# Patient Record
Sex: MALE | Race: WHITE | Employment: FULL TIME | URBAN - METROPOLITAN AREA
[De-identification: names, ages, dates, MRNs, and addresses within clinical notes are randomized per-mention and may not be internally consistent; named-entity substitution may affect disease eponyms.]

---

## 2017-02-18 ENCOUNTER — TELEPHONE (OUTPATIENT)
Dept: INTERNAL MEDICINE CLINIC | Facility: CLINIC | Age: 57
End: 2017-02-18

## 2017-02-18 ENCOUNTER — APPOINTMENT (OUTPATIENT)
Dept: CT IMAGING | Facility: HOSPITAL | Age: 57
End: 2017-02-18
Attending: EMERGENCY MEDICINE
Payer: COMMERCIAL

## 2017-02-18 ENCOUNTER — HOSPITAL ENCOUNTER (OUTPATIENT)
Age: 57
Discharge: NURSING, REHAB OR LONG-TERM CARE | End: 2017-02-18
Attending: FAMILY MEDICINE
Payer: COMMERCIAL

## 2017-02-18 ENCOUNTER — HOSPITAL ENCOUNTER (EMERGENCY)
Facility: HOSPITAL | Age: 57
Discharge: HOME | End: 2017-02-18
Attending: EMERGENCY MEDICINE
Payer: COMMERCIAL

## 2017-02-18 VITALS
HEIGHT: 68 IN | OXYGEN SATURATION: 96 % | BODY MASS INDEX: 30.31 KG/M2 | DIASTOLIC BLOOD PRESSURE: 65 MMHG | SYSTOLIC BLOOD PRESSURE: 126 MMHG | WEIGHT: 200 LBS | HEART RATE: 71 BPM | TEMPERATURE: 98 F | RESPIRATION RATE: 16 BRPM

## 2017-02-18 VITALS
TEMPERATURE: 98 F | DIASTOLIC BLOOD PRESSURE: 83 MMHG | HEIGHT: 68 IN | RESPIRATION RATE: 19 BRPM | OXYGEN SATURATION: 98 % | HEART RATE: 68 BPM | WEIGHT: 203 LBS | SYSTOLIC BLOOD PRESSURE: 131 MMHG | BODY MASS INDEX: 30.77 KG/M2

## 2017-02-18 DIAGNOSIS — R42 DIZZINESS: Primary | ICD-10-CM

## 2017-02-18 DIAGNOSIS — R42 LIGHTHEADEDNESS: Primary | ICD-10-CM

## 2017-02-18 DIAGNOSIS — K21.00 GASTROESOPHAGEAL REFLUX DISEASE WITH ESOPHAGITIS: ICD-10-CM

## 2017-02-18 DIAGNOSIS — K21.9 GASTROESOPHAGEAL REFLUX DISEASE WITHOUT ESOPHAGITIS: ICD-10-CM

## 2017-02-18 LAB
ANION GAP SERPL CALC-SCNC: 7 MMOL/L (ref 0–18)
BASOPHILS # BLD: 0 K/UL (ref 0–0.2)
BASOPHILS NFR BLD: 1 %
BUN SERPL-MCNC: 9 MG/DL (ref 8–20)
BUN/CREAT SERPL: 7.4 (ref 10–20)
CALCIUM SERPL-MCNC: 9.1 MG/DL (ref 8.5–10.5)
CHLORIDE SERPL-SCNC: 106 MMOL/L (ref 95–110)
CO2 SERPL-SCNC: 25 MMOL/L (ref 22–32)
CREAT SERPL-MCNC: 1.22 MG/DL (ref 0.5–1.5)
EOSINOPHIL # BLD: 0.1 K/UL (ref 0–0.7)
EOSINOPHIL NFR BLD: 1 %
ERYTHROCYTE [DISTWIDTH] IN BLOOD BY AUTOMATED COUNT: 13.7 % (ref 11–15)
GLUCOSE SERPL-MCNC: 101 MG/DL (ref 70–99)
HCT VFR BLD AUTO: 42.7 % (ref 41–52)
HGB BLD-MCNC: 14.3 G/DL (ref 13.5–17.5)
LYMPHOCYTES # BLD: 2.1 K/UL (ref 1–4)
LYMPHOCYTES NFR BLD: 28 %
MCH RBC QN AUTO: 30 PG (ref 27–32)
MCHC RBC AUTO-ENTMCNC: 33.5 G/DL (ref 32–37)
MCV RBC AUTO: 89.5 FL (ref 80–100)
MONOCYTES # BLD: 0.7 K/UL (ref 0–1)
MONOCYTES NFR BLD: 9 %
NEUTROPHILS # BLD AUTO: 4.6 K/UL (ref 1.8–7.7)
NEUTROPHILS NFR BLD: 61 %
OSMOLALITY UR CALC.SUM OF ELEC: 285 MOSM/KG (ref 275–295)
PLATELET # BLD AUTO: 268 K/UL (ref 140–400)
PMV BLD AUTO: 8.1 FL (ref 7.4–10.3)
POTASSIUM SERPL-SCNC: 3.8 MMOL/L (ref 3.3–5.1)
RBC # BLD AUTO: 4.76 M/UL (ref 4.5–5.9)
SODIUM SERPL-SCNC: 138 MMOL/L (ref 136–144)
TROPONIN I SERPL-MCNC: 0 NG/ML (ref ?–0.03)
WBC # BLD AUTO: 7.6 K/UL (ref 4–11)

## 2017-02-18 PROCEDURE — 85025 COMPLETE CBC W/AUTO DIFF WBC: CPT | Performed by: EMERGENCY MEDICINE

## 2017-02-18 PROCEDURE — 93010 ELECTROCARDIOGRAM REPORT: CPT

## 2017-02-18 PROCEDURE — 93005 ELECTROCARDIOGRAM TRACING: CPT

## 2017-02-18 PROCEDURE — 70450 CT HEAD/BRAIN W/O DYE: CPT

## 2017-02-18 PROCEDURE — 36415 COLL VENOUS BLD VENIPUNCTURE: CPT

## 2017-02-18 PROCEDURE — 99284 EMERGENCY DEPT VISIT MOD MDM: CPT

## 2017-02-18 PROCEDURE — 99205 OFFICE O/P NEW HI 60 MIN: CPT

## 2017-02-18 PROCEDURE — 93010 ELECTROCARDIOGRAM REPORT: CPT | Performed by: FAMILY MEDICINE

## 2017-02-18 PROCEDURE — 84484 ASSAY OF TROPONIN QUANT: CPT | Performed by: EMERGENCY MEDICINE

## 2017-02-18 PROCEDURE — 80048 BASIC METABOLIC PNL TOTAL CA: CPT | Performed by: EMERGENCY MEDICINE

## 2017-02-18 RX ORDER — FAMOTIDINE 20 MG/1
20 TABLET ORAL 2 TIMES DAILY PRN
Qty: 30 TABLET | Refills: 0 | Status: SHIPPED | OUTPATIENT
Start: 2017-02-18 | End: 2017-03-20

## 2017-02-18 RX ORDER — DICYCLOMINE HYDROCHLORIDE 10 MG/1
10 CAPSULE ORAL AS NEEDED
COMMUNITY
End: 2018-10-04

## 2017-02-18 RX ORDER — FAMOTIDINE 20 MG/1
20 TABLET ORAL ONCE
Status: DISCONTINUED | OUTPATIENT
Start: 2017-02-18 | End: 2017-02-18

## 2017-02-18 RX ORDER — FAMOTIDINE 20 MG/1
TABLET ORAL
Status: COMPLETED
Start: 2017-02-18 | End: 2017-02-18

## 2017-02-18 NOTE — TELEPHONE ENCOUNTER
Patient experiencing light headed for the past 10 days   Not sure if he should just go to the emergergecy room   Has appt.  With Dr. Poonam Jimenez on 2/21  Transferred to nurse

## 2017-02-18 NOTE — TELEPHONE ENCOUNTER
Reason for Call/Chief Complaint: States has been feeling lightheaded for 10 days   Onset: States has been coming and going for 10 days   Nursing Assessment/Associated Symptoms: Patient states had sinusitis about a month ago, was treated with Augmentin, pre clinic contact information, hours of operation and will call back if needed. Patient verbalizes understanding and agrees with plan.

## 2017-02-18 NOTE — ED PROVIDER NOTES
Patient Seen in: [address]    History   Patient presents with:  Dizziness (neurologic)    Stated Complaint: dizziness for 10 days     HPI    Patient was stated complaint of \"dizziness\" for approximately 10 days.     Pa type sensation and denies lightheadedness.   He denies weakness, speech difficulty or change in gait    Past Medical History   Diagnosis Date   • Irritable bowel syndrome (IBS)    • Esophageal reflux    • Osteoarthrosis, unspecified whether generalized or l 02/18/17 1441 68   Resp 02/18/17 1441 19   Temp 02/18/17 1441 97.5 °F (36.4 °C)   Temp src 02/18/17 1441 Oral   SpO2 02/18/17 1441 98 %   O2 Device 02/18/17 1441 None (Room air)       Current:/83 mmHg  Pulse 68  Temp(Src) 97.5 °F (36.4 °C) (Oral)  Re to ed    Follow-up:  [facility] Emergency Department  [address]   [address] [address] [phone]  Go to        Medications Prescribed:  Current Discharge Medication List

## 2017-02-18 NOTE — ED INITIAL ASSESSMENT (HPI)
Patient sent from immediate care- c/o of dizziness for x10 days with left side fullness in the head and shoulder. Patient denies any numbness/tingling in extremities and/or vision changes. Denies CP and SOB. Strength equal bilaterally x4 extremities.

## 2017-02-18 NOTE — ED NOTES
PATIENT IS COMPLAINING THAT FOR THE LAST TWO WEEKS HE HAS BEEN HAVING BOUTS OF DIZZINESS. HE STATES THAT AT TIMES HIS VISION IS DIFFERENT ON ONE SIDE OF HIS VISION.   HE STATES HE IS TRYING TO EAT SMALLER MEALS AND IS TAKING A LOT OF TUMS AND DRINKING JIMBO

## 2017-02-18 NOTE — ED INITIAL ASSESSMENT (HPI)
PATIENT IS HERE WITH DIZZINESS ON AND OFF FOR THE LAST 10 DAYS ALONG WITH FREQUENT ACID REFLEX PAIN.

## 2017-02-18 NOTE — ED NOTES
PATIENT TO BE SENT TO [facility] ER FOR FURTHER EVALUATION. VERBAL REPORT GIVEN TO BREE MOODY NURSE IN THE ER.

## 2017-02-19 NOTE — ED PROVIDER NOTES
Patient Seen in: [facility] AND [facility] Emergency Department    History   Patient presents with:  Dizziness (neurologic)    Stated Complaint:     HPI    Patient is a 65-year-old male who presents to the emergency room with a chief complaint of dizziness and l as noted above. PSFH elements reviewed from today and agreed except as otherwise stated in HPI.     Physical Exam       ED Triage Vitals   BP 02/18/17 1610 145/79 mmHg   Pulse 02/18/17 1610 72   Resp 02/18/17 1610 16   Temp 02/18/17 1610 98.3 °F (36.8 °C ---------                               -----------         ------                     CBC W/ DIFFERENTIAL[[id number]]                              Final result                 Please view results for these tests on the individual orders.    RAINBOW

## 2017-02-20 NOTE — TELEPHONE ENCOUNTER
Was seen in the ED and placed on Pepcid which after taking the second dose had trouble breathing. He will not take again. Patient has an appointment to see Mylene Haynes tomorrow 2/21/17 at which time he stated he will discuss.

## 2017-02-21 ENCOUNTER — OFFICE VISIT (OUTPATIENT)
Dept: INTERNAL MEDICINE CLINIC | Facility: CLINIC | Age: 57
End: 2017-02-21

## 2017-02-21 VITALS
DIASTOLIC BLOOD PRESSURE: 76 MMHG | SYSTOLIC BLOOD PRESSURE: 125 MMHG | WEIGHT: 203 LBS | HEART RATE: 78 BPM | BODY MASS INDEX: 30.77 KG/M2 | TEMPERATURE: 99 F | HEIGHT: 68 IN | RESPIRATION RATE: 12 BRPM

## 2017-02-21 DIAGNOSIS — K21.9 GASTROESOPHAGEAL REFLUX DISEASE, ESOPHAGITIS PRESENCE NOT SPECIFIED: ICD-10-CM

## 2017-02-21 DIAGNOSIS — R42 DIZZINESS AND GIDDINESS: Primary | ICD-10-CM

## 2017-02-21 PROCEDURE — 99214 OFFICE O/P EST MOD 30 MIN: CPT | Performed by: INTERNAL MEDICINE

## 2017-02-21 PROCEDURE — 99212 OFFICE O/P EST SF 10 MIN: CPT | Performed by: INTERNAL MEDICINE

## 2017-02-21 NOTE — PROGRESS NOTES
HPI:    Patient ID: Sd Gar is a 49year old male. Dizziness  This is a new (feeling lightheaded ) problem. The current episode started 1 to 4 weeks ago (10 days). The problem occurs intermittently. The problem has been waxing and waning.  Vanda Hines throat. ds   PHYSICAL EXAM:   Physical Exam   Constitutional: He appears well-developed and well-nourished. No distress.    HENT:   Right Ear: External ear normal.   Left Ear: External ear normal.   Nose: Nose normal.   Mouth/Throat: Oropharynx is clear and

## 2017-03-21 PROCEDURE — 88305 TISSUE EXAM BY PATHOLOGIST: CPT | Performed by: INTERNAL MEDICINE

## 2017-04-11 PROBLEM — R42 DIZZINESS AND GIDDINESS: Status: RESOLVED | Noted: 2017-02-21 | Resolved: 2017-04-11

## 2017-04-11 PROBLEM — K21.9 GASTROESOPHAGEAL REFLUX DISEASE: Status: RESOLVED | Noted: 2017-02-21 | Resolved: 2017-04-11

## 2017-04-11 PROBLEM — J38.7 IRRITABLE LARYNX SYNDROME: Status: ACTIVE | Noted: 2017-04-11

## 2017-04-11 PROBLEM — K21.9 LPRD (LARYNGOPHARYNGEAL REFLUX DISEASE): Status: ACTIVE | Noted: 2017-04-11

## 2018-09-18 NOTE — TELEPHONE ENCOUNTER
Patient requesting refill for Bentyl  (Dicyclomine) 20 mg. Patient has been on medication for 30 yrs and needs new RX. Patient is traveling so unable to come in for an appointment.        Please advise this is for irritable bowel syndrome    Patient is almost out of medication

## 2018-09-21 NOTE — TELEPHONE ENCOUNTER
Pt is calling to f/u on refill request.       Current Outpatient Medications:  Dicyclomine HCl 10 MG Oral Cap Take 10 mg by mouth as needed.  Disp:  Rfl:

## 2018-09-25 RX ORDER — DICYCLOMINE HYDROCHLORIDE 10 MG/1
10 CAPSULE ORAL AS NEEDED
Qty: 90 CAPSULE | Refills: 0 | OUTPATIENT
Start: 2018-09-25

## 2018-10-04 ENCOUNTER — OFFICE VISIT (OUTPATIENT)
Dept: INTERNAL MEDICINE CLINIC | Facility: CLINIC | Age: 58
End: 2018-10-04
Payer: COMMERCIAL

## 2018-10-04 VITALS
BODY MASS INDEX: 32 KG/M2 | TEMPERATURE: 99 F | SYSTOLIC BLOOD PRESSURE: 130 MMHG | HEART RATE: 72 BPM | DIASTOLIC BLOOD PRESSURE: 80 MMHG | WEIGHT: 207.31 LBS

## 2018-10-04 DIAGNOSIS — K58.9 IRRITABLE BOWEL SYNDROME, UNSPECIFIED TYPE: ICD-10-CM

## 2018-10-04 DIAGNOSIS — Z12.5 PROSTATE CANCER SCREENING: ICD-10-CM

## 2018-10-04 DIAGNOSIS — Z00.00 ANNUAL PHYSICAL EXAM: Primary | ICD-10-CM

## 2018-10-04 DIAGNOSIS — K21.9 GASTROESOPHAGEAL REFLUX DISEASE, ESOPHAGITIS PRESENCE NOT SPECIFIED: ICD-10-CM

## 2018-10-04 PROCEDURE — 99396 PREV VISIT EST AGE 40-64: CPT | Performed by: INTERNAL MEDICINE

## 2018-10-04 RX ORDER — DICYCLOMINE HYDROCHLORIDE 10 MG/1
10 CAPSULE ORAL
Qty: 120 CAPSULE | Refills: 5 | Status: SHIPPED | OUTPATIENT
Start: 2018-10-04

## 2018-10-04 NOTE — PROGRESS NOTES
HPI:    Patient ID: Jefe Roldan is a 56year old male. Patient presents today for his chekup/physical. He also wants to refill bentyl for his IBS.  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  Disp:  Rfl:      Allergies:  Famotidine              SWELLING    Comment:Patient states med caused his throat to swell and encounter diagnosis)  Plan: COMP METABOLIC PANEL (14), LIPID PANEL, CBC         WITH DIFFERENTIAL WITH PLATELET        Check routine labs. Pt advised to get his colonoscopy.  Pt to get his flu shot from his pharmacy.     (Z12.5) Prostate cancer screening  P

## 2019-08-26 ENCOUNTER — TELEPHONE (OUTPATIENT)
Dept: INTERNAL MEDICINE CLINIC | Facility: CLINIC | Age: 59
End: 2019-08-26

## 2019-08-26 NOTE — TELEPHONE ENCOUNTER
Spoke with patient ( verified) RE: symptoms of tremor below--patient states he has had tremor in right pinky finger x 5 years--had mentioned it to EL at [address] [place] [address]. EL had told patient at that time to monitor and call if worsening.     Patient reports that he h

## 2019-08-26 NOTE — TELEPHONE ENCOUNTER
Spoke with patient ( verified) and relayed EL message below--patient verbalizes understanding and agreement. No further questions/concerns at this time.

## 2019-08-26 NOTE — TELEPHONE ENCOUNTER
Patient has a PPO insurance and would like to know if Dr. Darlin Bradley give him a recommendation for a neurologist for her tremor. Dr and patient spoke about it last year. Patient mentioned it was getting worse.

## 2024-01-11 ENCOUNTER — HOSPITAL ENCOUNTER (EMERGENCY)
Age: 64
Discharge: HOME | End: 2024-01-11
Attending: STUDENT IN AN ORGANIZED HEALTH CARE EDUCATION/TRAINING PROGRAM

## 2024-01-11 ENCOUNTER — APPOINTMENT (OUTPATIENT)
Dept: CT IMAGING | Age: 64
End: 2024-01-11
Attending: STUDENT IN AN ORGANIZED HEALTH CARE EDUCATION/TRAINING PROGRAM

## 2024-01-11 VITALS
DIASTOLIC BLOOD PRESSURE: 87 MMHG | BODY MASS INDEX: 30.14 KG/M2 | SYSTOLIC BLOOD PRESSURE: 171 MMHG | WEIGHT: 198.85 LBS | RESPIRATION RATE: 18 BRPM | HEART RATE: 68 BPM | TEMPERATURE: 97.9 F | OXYGEN SATURATION: 97 % | HEIGHT: 68 IN

## 2024-01-11 DIAGNOSIS — R42 DIZZINESS: Primary | ICD-10-CM

## 2024-01-11 LAB
ALBUMIN SERPL-MCNC: 3.7 G/DL (ref 3.6–5.1)
ALBUMIN/GLOB SERPL: 0.9 {RATIO} (ref 1–2.4)
ALP SERPL-CCNC: 67 UNITS/L (ref 45–117)
ALT SERPL-CCNC: 22 UNITS/L
ANION GAP SERPL CALC-SCNC: 9 MMOL/L (ref 7–19)
AST SERPL-CCNC: 15 UNITS/L
BASOPHILS # BLD: 0 K/MCL (ref 0–0.3)
BASOPHILS NFR BLD: 0 %
BILIRUB SERPL-MCNC: 0.9 MG/DL (ref 0.2–1)
BUN SERPL-MCNC: 19 MG/DL (ref 6–20)
BUN/CREAT SERPL: 15 (ref 7–25)
CALCIUM SERPL-MCNC: 8.9 MG/DL (ref 8.4–10.2)
CHLORIDE SERPL-SCNC: 110 MMOL/L (ref 97–110)
CO2 SERPL-SCNC: 26 MMOL/L (ref 21–32)
CREAT SERPL-MCNC: 1.24 MG/DL (ref 0.67–1.17)
DEPRECATED RDW RBC: 43.6 FL (ref 39–50)
EGFRCR SERPLBLD CKD-EPI 2021: 66 ML/MIN/{1.73_M2}
EOSINOPHIL # BLD: 0.1 K/MCL (ref 0–0.5)
EOSINOPHIL NFR BLD: 1 %
ERYTHROCYTE [DISTWIDTH] IN BLOOD: 13.3 % (ref 11–15)
FASTING DURATION TIME PATIENT: ABNORMAL H
GLOBULIN SER-MCNC: 3.9 G/DL (ref 2–4)
GLUCOSE SERPL-MCNC: 97 MG/DL (ref 70–99)
HCT VFR BLD CALC: 42.1 % (ref 39–51)
HGB BLD-MCNC: 14.1 G/DL (ref 13–17)
IMM GRANULOCYTES # BLD AUTO: 0 K/MCL (ref 0–0.2)
IMM GRANULOCYTES # BLD: 0 %
LYMPHOCYTES # BLD: 2.1 K/MCL (ref 1–4)
LYMPHOCYTES NFR BLD: 28 %
MAGNESIUM SERPL-MCNC: 2.1 MG/DL (ref 1.7–2.4)
MCH RBC QN AUTO: 29.9 PG (ref 26–34)
MCHC RBC AUTO-ENTMCNC: 33.5 G/DL (ref 32–36.5)
MCV RBC AUTO: 89.4 FL (ref 78–100)
MONOCYTES # BLD: 0.5 K/MCL (ref 0.3–0.9)
MONOCYTES NFR BLD: 7 %
NEUTROPHILS # BLD: 4.8 K/MCL (ref 1.8–7.7)
NEUTROPHILS NFR BLD: 64 %
NRBC BLD MANUAL-RTO: 0 /100 WBC
PLATELET # BLD AUTO: 226 K/MCL (ref 140–450)
POTASSIUM SERPL-SCNC: 3.8 MMOL/L (ref 3.4–5.1)
PROT SERPL-MCNC: 7.6 G/DL (ref 6.4–8.2)
RAINBOW EXTRA TUBES HOLD SPECIMEN: NORMAL
RAINBOW EXTRA TUBES HOLD SPECIMEN: NORMAL
RBC # BLD: 4.71 MIL/MCL (ref 4.5–5.9)
SODIUM SERPL-SCNC: 141 MMOL/L (ref 135–145)
TROPONIN I SERPL DL<=0.01 NG/ML-MCNC: 6 NG/L
WBC # BLD: 7.5 K/MCL (ref 4.2–11)

## 2024-01-11 PROCEDURE — 84484 ASSAY OF TROPONIN QUANT: CPT | Performed by: STUDENT IN AN ORGANIZED HEALTH CARE EDUCATION/TRAINING PROGRAM

## 2024-01-11 PROCEDURE — 85025 COMPLETE CBC W/AUTO DIFF WBC: CPT | Performed by: STUDENT IN AN ORGANIZED HEALTH CARE EDUCATION/TRAINING PROGRAM

## 2024-01-11 PROCEDURE — 10002803 HB RX 637: Performed by: STUDENT IN AN ORGANIZED HEALTH CARE EDUCATION/TRAINING PROGRAM

## 2024-01-11 PROCEDURE — 10002807 HB RX 258: Performed by: STUDENT IN AN ORGANIZED HEALTH CARE EDUCATION/TRAINING PROGRAM

## 2024-01-11 PROCEDURE — 93005 ELECTROCARDIOGRAM TRACING: CPT | Performed by: STUDENT IN AN ORGANIZED HEALTH CARE EDUCATION/TRAINING PROGRAM

## 2024-01-11 PROCEDURE — 93010 ELECTROCARDIOGRAM REPORT: CPT | Performed by: INTERNAL MEDICINE

## 2024-01-11 PROCEDURE — 83735 ASSAY OF MAGNESIUM: CPT | Performed by: STUDENT IN AN ORGANIZED HEALTH CARE EDUCATION/TRAINING PROGRAM

## 2024-01-11 PROCEDURE — 70450 CT HEAD/BRAIN W/O DYE: CPT

## 2024-01-11 PROCEDURE — 80053 COMPREHEN METABOLIC PANEL: CPT | Performed by: STUDENT IN AN ORGANIZED HEALTH CARE EDUCATION/TRAINING PROGRAM

## 2024-01-11 RX ORDER — MECLIZINE HYDROCHLORIDE 25 MG/1
25 TABLET ORAL ONCE
Status: COMPLETED | OUTPATIENT
Start: 2024-01-11 | End: 2024-01-11

## 2024-01-11 RX ORDER — MECLIZINE HYDROCHLORIDE 25 MG/1
25 TABLET ORAL 3 TIMES DAILY PRN
Qty: 15 TABLET | Refills: 0 | Status: SHIPPED | OUTPATIENT
Start: 2024-01-11

## 2024-01-11 RX ADMIN — SODIUM CHLORIDE 1000 ML: 9 INJECTION, SOLUTION INTRAVENOUS at 10:35

## 2024-01-11 RX ADMIN — MECLIZINE HYDROCHLORIDE 25 MG: 25 TABLET ORAL at 10:39

## 2024-01-11 ASSESSMENT — PAIN SCALES - GENERAL: PAINLEVEL_OUTOF10: 0

## 2024-01-11 ASSESSMENT — ENCOUNTER SYMPTOMS: DIZZINESS: 1

## 2024-01-12 LAB
ATRIAL RATE (BPM): 59
P AXIS (DEGREES): -15
PR-INTERVAL (MSEC): 178
QRS-INTERVAL (MSEC): 84
QT-INTERVAL (MSEC): 384
QTC: 380
R AXIS (DEGREES): 44
REPORT TEXT: NORMAL
T AXIS (DEGREES): 18
VENTRICULAR RATE EKG/MIN (BPM): 59

## 2024-01-27 ENCOUNTER — TELEPHONE (OUTPATIENT)
Dept: INTERNAL MEDICINE CLINIC | Facility: CLINIC | Age: 64
End: 2024-01-27

## 2024-01-27 NOTE — TELEPHONE ENCOUNTER
Pt states b/p was elevated in the beginning of January and went to ER (see care everywhere).  Pt states he went to a local provider because ER discharged him and he was concerned his b/p was still running 150/90.  Per chart pt was given medication for b/p \"but I don't want to take it until I run it past Dr Esqueda.    Pt also states he saw optometrist and was told looks like he has bulging nerve and pt has appointment now with a \"medical optometrist\" next week.    LOV with Dr Esqueda was 2018 and scheduled online for 2/8/24.  Pt will keep existing OV and also plans on f/u with Dr Alfred.  Advised pt to call insurance to make sure they will cover both visits since they may only pay for pt to have on PCP.  Pt states he wants Dr Esqueda as PCP.      Advised ER if symptoms worsen              1/27/24 10:34 AM  Note  Left message for patient to call back and discuss.                     1/27/24 10:33 AM  Devi Alvarez RN attempted to contact Aguila Rodríguez (Left Message)            1/27/24 10:26 AM  Frida Mcgregor, MAURICIO routed this conversation to Devi Alvarez RN          1/27/24 10:15 AM  Princess De La O routed this conversation to Em Rn Princess Herrera      1/27/24 10:15 AM  Note  Patient scheduled appointment via [facility] for 02/08/2024 with the following:      blood pressure and vision lightheadedness      Also left voicemail, patient needs to be scheduled as a new patient.

## 2024-02-08 ENCOUNTER — LAB ENCOUNTER (OUTPATIENT)
Dept: LAB | Age: 64
End: 2024-02-08
Attending: INTERNAL MEDICINE
Payer: COMMERCIAL

## 2024-02-08 ENCOUNTER — OFFICE VISIT (OUTPATIENT)
Dept: INTERNAL MEDICINE CLINIC | Facility: CLINIC | Age: 64
End: 2024-02-08

## 2024-02-08 ENCOUNTER — EKG ENCOUNTER (OUTPATIENT)
Dept: LAB | Age: 64
End: 2024-02-08
Attending: INTERNAL MEDICINE
Payer: COMMERCIAL

## 2024-02-08 VITALS
DIASTOLIC BLOOD PRESSURE: 80 MMHG | BODY MASS INDEX: 27.96 KG/M2 | HEIGHT: 68 IN | OXYGEN SATURATION: 96 % | WEIGHT: 184.5 LBS | SYSTOLIC BLOOD PRESSURE: 142 MMHG | HEART RATE: 62 BPM | TEMPERATURE: 98 F

## 2024-02-08 DIAGNOSIS — Z00.00 ANNUAL PHYSICAL EXAM: ICD-10-CM

## 2024-02-08 DIAGNOSIS — Z12.11 COLON CANCER SCREENING: ICD-10-CM

## 2024-02-08 DIAGNOSIS — K58.9 IRRITABLE BOWEL SYNDROME, UNSPECIFIED TYPE: ICD-10-CM

## 2024-02-08 DIAGNOSIS — Z00.00 ANNUAL PHYSICAL EXAM: Primary | ICD-10-CM

## 2024-02-08 DIAGNOSIS — H35.033 HYPERTENSIVE RETINOPATHY OF BOTH EYES: ICD-10-CM

## 2024-02-08 DIAGNOSIS — I10 HYPERTENSION, ESSENTIAL: ICD-10-CM

## 2024-02-08 DIAGNOSIS — Z12.5 PROSTATE CANCER SCREENING: ICD-10-CM

## 2024-02-08 DIAGNOSIS — N30.10 INTERSTITIAL CYSTITIS: ICD-10-CM

## 2024-02-08 LAB
ATRIAL RATE: 48 BPM
BILIRUB UR QL: NEGATIVE
CLARITY UR: CLEAR
COLOR UR: YELLOW
GLUCOSE UR-MCNC: NORMAL MG/DL
HGB UR QL STRIP.AUTO: NEGATIVE
KETONES UR-MCNC: NEGATIVE MG/DL
LEUKOCYTE ESTERASE UR QL STRIP.AUTO: NEGATIVE
NITRITE UR QL STRIP.AUTO: NEGATIVE
P AXIS: 66 DEGREES
P-R INTERVAL: 192 MS
PH UR: 5 [PH] (ref 5–8)
Q-T INTERVAL: 410 MS
QRS DURATION: 90 MS
QTC CALCULATION (BEZET): 366 MS
R AXIS: 60 DEGREES
SP GR UR STRIP: 1.03 (ref 1–1.03)
T AXIS: 35 DEGREES
UROBILINOGEN UR STRIP-ACNC: NORMAL
VENTRICULAR RATE: 48 BPM

## 2024-02-08 PROCEDURE — 93010 ELECTROCARDIOGRAM REPORT: CPT | Performed by: STUDENT IN AN ORGANIZED HEALTH CARE EDUCATION/TRAINING PROGRAM

## 2024-02-08 PROCEDURE — 93005 ELECTROCARDIOGRAM TRACING: CPT

## 2024-02-08 PROCEDURE — 3077F SYST BP >= 140 MM HG: CPT | Performed by: INTERNAL MEDICINE

## 2024-02-08 PROCEDURE — 81003 URINALYSIS AUTO W/O SCOPE: CPT

## 2024-02-08 PROCEDURE — 99386 PREV VISIT NEW AGE 40-64: CPT | Performed by: INTERNAL MEDICINE

## 2024-02-08 PROCEDURE — 3079F DIAST BP 80-89 MM HG: CPT | Performed by: INTERNAL MEDICINE

## 2024-02-08 PROCEDURE — 3008F BODY MASS INDEX DOCD: CPT | Performed by: INTERNAL MEDICINE

## 2024-02-08 RX ORDER — MECLIZINE HYDROCHLORIDE 25 MG/1
1 TABLET ORAL 3 TIMES DAILY PRN
COMMUNITY
Start: 2024-01-11

## 2024-02-08 RX ORDER — LOSARTAN POTASSIUM 25 MG/1
TABLET ORAL
COMMUNITY
Start: 2024-01-18

## 2024-02-08 RX ORDER — LOSARTAN POTASSIUM 25 MG/1
25 TABLET ORAL DAILY
Qty: 30 TABLET | Refills: 1 | Status: SHIPPED | OUTPATIENT
Start: 2024-02-08

## 2024-02-08 NOTE — PROGRESS NOTES
Subjective:     Patient ID: Aguila Rodríguez is a 63 year old male.    Patient presents today regarding hypertension. Was diagnosed to have hypertensive retinopathy by optha after he presented to optometrist with complaint of vision problems while playing video games. He states his bp noted also to be elevated and had to go to ER. He had workup done including ct brain which was negative, EKG normal and labs remarkable for mildly elevated creatinine. He then ffup with doctor who had prescribed pt losartan but pt didn't start med due to concerns of possible side effect or affect his IBS.   He had been our patient in the past but has not seen us for more than 5 yrs. He has no history of hypertension before.   Pt also is due for annual physical.     Hypertension  This is a new (noted to have elevated bp when he checked at home.  had vision symptoms prior.) problem. The current episode started 1 to 4 weeks ago. The problem is unchanged. The problem is uncontrolled. Associated symptoms include blurred vision. Pertinent negatives include no chest pain, peripheral edema or shortness of breath. There are no associated agents to hypertension. There are no known risk factors for coronary artery disease. Past treatments include nothing. There are no compliance problems.  There is no history of angina, kidney disease, CAD/MI, CVA, heart failure or PVD. There is no history of chronic renal disease, a hypertension causing med or a thyroid problem.       History/Other:   Review of Systems   Constitutional: Negative.    Eyes:  Positive for blurred vision.   Respiratory: Negative.  Negative for shortness of breath.    Cardiovascular:  Negative for chest pain.   Gastrointestinal:  Positive for anal bleeding. Negative for blood in stool, constipation, nausea and vomiting.   Genitourinary: Negative.    Hematological: Negative.      Current Outpatient Medications   Medication Sig Dispense Refill    VITAMIN D, CHOLECALCIFEROL, OR Take 1  tablet by mouth daily.      Dicyclomine HCl 10 MG Oral Cap Take 1 capsule (10 mg total) by mouth 4 (four) times daily before meals and nightly. 120 capsule 5    DAILY MULTIPLE VITAMINS Oral Tab Take 1 tablet by mouth daily.      losartan 25 MG Oral Tab Take by mouth. (Patient not taking: Reported on 2/8/2024)      meclizine 25 MG Oral Tab Take 1 tablet (25 mg total) by mouth 3 (three) times daily as needed. (Patient not taking: Reported on 2/8/2024)      Spacer/Aero-Holding Chambers (VALVED HOLDING CHAMBER) Does not apply Device U UTD (Patient not taking: Reported on 2/8/2024)  0     Allergies:  Allergies   Allergen Reactions    Famotidine SWELLING     Patient states med caused his throat to swell and cause gagging.     Omeprazole SWELLING     Patient states cause a lump in  His throat. ds       Past Medical History:   Diagnosis Date    Bone spur     C5/C6    Esophageal reflux     Irritable bowel syndrome (IBS)     Lipid screening 06/27/2012    Osteoarthrosis, unspecified whether generalized or localized, unspecified site       Past Surgical History:   Procedure Laterality Date    COLONOSCOPY & POLYPECTOMY  04-    colonoscopy w/ snare polypectomy    KNEE SURGERY        Family History   Problem Relation Age of Onset    Cancer Father         bladder    Ear Problems Mother       Social History:   Social History     Socioeconomic History    Marital status: Single   Tobacco Use    Smoking status: Never    Smokeless tobacco: Never   Vaping Use    Vaping Use: Never used   Substance and Sexual Activity    Alcohol use: Yes     Comment: rarely    Drug use: No   Other Topics Concern    Caffeine Concern No        Objective:   Physical Exam  Constitutional:       General: He is not in acute distress.     Appearance: He is well-developed. He is not ill-appearing, toxic-appearing or diaphoretic.   HENT:      Head: Normocephalic and atraumatic.      Right Ear: Tympanic membrane, ear canal and external ear normal.      Left  Ear: Tympanic membrane, ear canal and external ear normal.      Nose: Nose normal.      Mouth/Throat:      Pharynx: No oropharyngeal exudate.   Eyes:      General: No scleral icterus.        Right eye: No discharge.         Left eye: No discharge.      Conjunctiva/sclera: Conjunctivae normal.      Pupils: Pupils are equal, round, and reactive to light.   Neck:      Thyroid: No thyromegaly.      Vascular: No carotid bruit or JVD.   Cardiovascular:      Rate and Rhythm: Normal rate and regular rhythm.      Pulses: Normal pulses.      Heart sounds: Normal heart sounds. No murmur heard.     No friction rub. No gallop.   Pulmonary:      Effort: Pulmonary effort is normal. No respiratory distress.      Breath sounds: Normal breath sounds. No wheezing or rales.   Abdominal:      General: Abdomen is flat. Bowel sounds are normal. There is no distension or abdominal bruit.      Palpations: Abdomen is soft. There is no mass.      Tenderness: There is no abdominal tenderness. There is no guarding or rebound. Negative signs include Almodovar's sign and McBurney's sign.      Hernia: There is no hernia in the umbilical area.   Musculoskeletal:         General: Normal range of motion.      Cervical back: Normal range of motion and neck supple. No rigidity or tenderness.      Right lower leg: No edema.      Left lower leg: No edema.   Lymphadenopathy:      Cervical: No cervical adenopathy.   Skin:     General: Skin is warm and dry.      Coloration: Skin is not jaundiced or pale.      Findings: No rash.   Neurological:      Mental Status: He is alert and oriented to person, place, and time.   Psychiatric:         Mood and Affect: Mood normal.         Assessment & Plan:   (Z00.00) Annual physical exam  (primary encounter diagnosis)  Plan: Comp Metabolic Panel (14), Hemoglobin A1C,         Lipid Panel, Urinalysis, Routine, PSA Total,         Screen, EKG 12 Lead to be performed at [facility]        Routine labs  ordered. Pt declined flu shot and covid booster.     (I10) Hypertension, essential  Plan: EKG 12 Lead to be performed at [facility] Blood Pressure         Flowsheet        D/w pt; since he already has end organ damage such as hypertensive retinopathy thus importance of control of bp to limit further complications from untreated/uncontrolled hypertension.   Will start pt on losartan 25mg po daily and will see him back in 4 weeks.     (H35.033) Hypertensive retinopathy of both eyes  Plan: control bp thus will start bp med. See above.     (K58.9) Irritable bowel syndrome, unspecified type  Plan: pt has chronic hx of iBS for more than 30 yrs, had been taking dicyclomine prn and remains effective per pt.     (Z12.11) Colon cancer screening  Plan: Gastro Referral - In Network        Pt due for his colonoscopy; pt referred to gastro.     (Z12.5) Prostate cancer screening  Plan: check psa       (N30.10) Interstitial cystitis  Plan: pt had been ff by uro DR Rodriguez before who had retired and pt has not ffup with urologist since then. Will plan to refer to other urologist.        No orders of the defined types were placed in this encounter.      Meds This Visit:  Requested Prescriptions      No prescriptions requested or ordered in this encounter       Imaging & Referrals:  None

## 2024-02-14 ENCOUNTER — TELEPHONE (OUTPATIENT)
Dept: INTERNAL MEDICINE CLINIC | Facility: CLINIC | Age: 64
End: 2024-02-14

## 2024-02-14 NOTE — TELEPHONE ENCOUNTER
Action Requested: Summary for Provider     []  Critical Lab, Recommendations Needed  [x] Need Additional Advice  []   FYI    []   Need Orders  [] Need Medications Sent to Pharmacy  []  Other     SUMMARY: Patient stated just started on Losartan and he is having some side effects.    Currently having lightheadedness, headache, and fatigued    /77,125/75,121/73,134/79 for the last 4 days    Has increase fluid    Asking if he should be worried about potassium foods and asking if ok to take with Meclizine/Tylenol    Advise no interaction found with both    Please advise    Reason for call: Medication Question  Onset: Data Unavailable

## 2024-02-14 NOTE — TELEPHONE ENCOUNTER
His potassium level was normal on labs done in ER before.  As long as he isnt having vomiting and diarrhea then should not have low potassium level.   Losartan also doesn't cause low potassium either.   His bp readings were good and I had just given him the lowest dose of losartan [facility] is 25mg daily; if he wants he can try to take 1/2 tab daily.

## 2024-02-15 NOTE — TELEPHONE ENCOUNTER
Routed to Dr Harris for advise, thanks.      Future Appointments   Date Time Provider Department Center   3/7/2024 11:30 AM Justin Esqueda MD [facility]

## 2024-02-15 NOTE — TELEPHONE ENCOUNTER
Called patient (name and  verified) to relay message.    Patient states that his blood pressure today was 118/70, HR 67    Patient states that watching TV is making his increasingly tired. Patient called this \"Cyber sickness\".     Last OV 24 for annual physical.  Noted patient just started the Losartan on 24.     Instructed to continue to keep blood pressure log and bring with at next appt.    Assisted patient with appt scheduling, verbalized understanding and agrees to plan.   Future Appointments   Date Time Provider Department Center   2024  4:00 PM Justin Esqueda MD [facility]   3/7/2024 11:30 AM Justin Esqueda MD [facility]

## 2024-02-18 ENCOUNTER — TELEPHONE (OUTPATIENT)
Dept: INTERNAL MEDICINE CLINIC | Facility: CLINIC | Age: 64
End: 2024-02-18

## 2024-02-18 NOTE — TELEPHONE ENCOUNTER
I spoke with the patient as the physician on-call.  Recently started on losartan 50 mg a day for elevated blood pressure.  Did not do well initially so he is now cut back down to 25 mg for the past week.  Readings over the past day or so it been in the 150s over 80s.  He notes the right arm is lower blood pressure the left arm.  Heart rate in the 82 range.  Usually it is in the 60s.  He is concerned about all this.  I advised him to increase the losartan from 25 up to a full pill at 50 mg a day.  Check blood pressure and contact us if things are abnormal.  He has an appointment with his primary care doctor this coming week.

## 2024-02-21 ENCOUNTER — TELEPHONE (OUTPATIENT)
Dept: INTERNAL MEDICINE CLINIC | Facility: CLINIC | Age: 64
End: 2024-02-21

## 2024-02-21 NOTE — TELEPHONE ENCOUNTER
Spoke with pt,  verified, pt stated he was seen  by Dr. Rosenberg on 24. Pt c/o extreme reaction on losartan 25 mg every night.   Pt been taking Rx for 10 days, since then he had rxn like sensitive to light and sound, migraine for 2 days, feeling tired, dizziness for 7 weeks, constipation, diarrhea, chills, feeling nervous.  Last noc, he experienced heaviness on bilateral arm and legs, can't even turn on his side or barely moved his arms, muscle are twitching.   Pt sat up at 2-4 in the morning and try to lay down again for 2 hrs.    Pt BP today Rt 140/80 P 73, left arm 157/79 P 74  Pt denies chest pain, shortness of breath, numbness/ tingling, no other sx, not in distress.  Pt was offered an appt today but declined.   Pt was advised if sx persist or gets worse to go to ER/ IC, he agreed and stated understanding.   Pt wants to know if he should continue taking losartan or take 1/2 tab tonight or hold Rx until ov tomorrow or any alternative.   pls advise, thanks in advance.         Future Appointments   Date Time Provider Department Center   2024  4:00 PM Justin Esqueda MD [facility]   3/7/2024 11:30 AM Justin Esqueda MD [facility]

## 2024-02-21 NOTE — TELEPHONE ENCOUNTER
Patient contacted and made aware of Dr. Esqueda's recommendations. Patient instructed any new or worsening symptoms [reviewed] seek immediate medical attention--> 911/ED. Patient verbalized understanding. No further questions or concerns at this time.    Future Appointments   Date Time Provider Department Center   2/22/2024  4:00 PM Justin Esqueda MD [facility]   3/7/2024 11:30 AM Justin Esqueda MD [facility]

## 2024-02-22 ENCOUNTER — OFFICE VISIT (OUTPATIENT)
Dept: INTERNAL MEDICINE CLINIC | Facility: CLINIC | Age: 64
End: 2024-02-22

## 2024-02-22 VITALS
TEMPERATURE: 98 F | HEART RATE: 62 BPM | SYSTOLIC BLOOD PRESSURE: 148 MMHG | WEIGHT: 181.5 LBS | OXYGEN SATURATION: 100 % | BODY MASS INDEX: 27.51 KG/M2 | DIASTOLIC BLOOD PRESSURE: 78 MMHG | HEIGHT: 68 IN

## 2024-02-22 DIAGNOSIS — I10 HYPERTENSION, ESSENTIAL: Primary | ICD-10-CM

## 2024-02-22 PROCEDURE — 3077F SYST BP >= 140 MM HG: CPT | Performed by: INTERNAL MEDICINE

## 2024-02-22 PROCEDURE — 3078F DIAST BP <80 MM HG: CPT | Performed by: INTERNAL MEDICINE

## 2024-02-22 PROCEDURE — 3008F BODY MASS INDEX DOCD: CPT | Performed by: INTERNAL MEDICINE

## 2024-02-22 PROCEDURE — 99213 OFFICE O/P EST LOW 20 MIN: CPT | Performed by: INTERNAL MEDICINE

## 2024-02-22 RX ORDER — AMLODIPINE BESYLATE 2.5 MG/1
2.5 TABLET ORAL DAILY
Qty: 30 TABLET | Refills: 0 | Status: SHIPPED | OUTPATIENT
Start: 2024-02-22

## 2024-02-23 ENCOUNTER — LAB ENCOUNTER (OUTPATIENT)
Dept: LAB | Age: 64
End: 2024-02-23
Attending: INTERNAL MEDICINE
Payer: COMMERCIAL

## 2024-02-23 DIAGNOSIS — Z00.00 ANNUAL PHYSICAL EXAM: ICD-10-CM

## 2024-02-23 LAB
ALBUMIN SERPL-MCNC: 3.8 G/DL (ref 3.4–5)
ALBUMIN/GLOB SERPL: 1.1 {RATIO} (ref 1–2)
ALP LIVER SERPL-CCNC: 64 U/L
ALT SERPL-CCNC: 16 U/L
ANION GAP SERPL CALC-SCNC: 0 MMOL/L (ref 0–18)
AST SERPL-CCNC: 9 U/L (ref 15–37)
BILIRUB SERPL-MCNC: 1.1 MG/DL (ref 0.1–2)
BUN BLD-MCNC: 22 MG/DL (ref 9–23)
CALCIUM BLD-MCNC: 9 MG/DL (ref 8.5–10.1)
CHLORIDE SERPL-SCNC: 112 MMOL/L (ref 98–112)
CHOLEST SERPL-MCNC: 120 MG/DL (ref ?–200)
CO2 SERPL-SCNC: 27 MMOL/L (ref 21–32)
COMPLEXED PSA SERPL-MCNC: 1.47 NG/ML (ref ?–4)
CREAT BLD-MCNC: 1.55 MG/DL
EGFRCR SERPLBLD CKD-EPI 2021: 51 ML/MIN/1.73M2 (ref 60–?)
EST. AVERAGE GLUCOSE BLD GHB EST-MCNC: 114 MG/DL (ref 68–126)
FASTING PATIENT LIPID ANSWER: YES
FASTING STATUS PATIENT QL REPORTED: YES
GLOBULIN PLAS-MCNC: 3.6 G/DL (ref 2.8–4.4)
GLUCOSE BLD-MCNC: 101 MG/DL (ref 70–99)
HBA1C MFR BLD: 5.6 % (ref ?–5.7)
HDLC SERPL-MCNC: 36 MG/DL (ref 40–59)
LDLC SERPL CALC-MCNC: 70 MG/DL (ref ?–100)
NONHDLC SERPL-MCNC: 84 MG/DL (ref ?–130)
OSMOLALITY SERPL CALC.SUM OF ELEC: 291 MOSM/KG (ref 275–295)
POTASSIUM SERPL-SCNC: 4.4 MMOL/L (ref 3.5–5.1)
PROT SERPL-MCNC: 7.4 G/DL (ref 6.4–8.2)
SODIUM SERPL-SCNC: 139 MMOL/L (ref 136–145)
TRIGL SERPL-MCNC: 65 MG/DL (ref 30–149)
VLDLC SERPL CALC-MCNC: 10 MG/DL (ref 0–30)

## 2024-02-23 PROCEDURE — 80053 COMPREHEN METABOLIC PANEL: CPT

## 2024-02-23 PROCEDURE — 80061 LIPID PANEL: CPT

## 2024-02-23 PROCEDURE — 36415 COLL VENOUS BLD VENIPUNCTURE: CPT

## 2024-02-23 PROCEDURE — 83036 HEMOGLOBIN GLYCOSYLATED A1C: CPT

## 2024-02-23 NOTE — PROGRESS NOTES
Subjective:     Patient ID: Aguila Rodríguez is a 63 year old male.    Patient present today for bp ffup; had seen me last week and started on losartan 25mg daily but to didn't tolerate med with having several symptoms (see previous tel encounters for past few days in epic).  I had to stop his bp med 2 days ago due to his complaints of side effects. He had been checking his bp at home and still been elevated though question of accuracy of his bp monitor.         History/Other:   Review of Systems   Constitutional: Negative.    Respiratory: Negative.     Cardiovascular: Negative.      Current Outpatient Medications   Medication Sig Dispense Refill    amLODIPine 2.5 MG Oral Tab Take 1 tablet (2.5 mg total) by mouth daily. 30 tablet 0    DAILY MULTIPLE VITAMINS Oral Tab Take 1 tablet by mouth daily.      losartan 25 MG Oral Tab Take by mouth. (Patient not taking: Reported on 2/8/2024)      meclizine 25 MG Oral Tab Take 1 tablet (25 mg total) by mouth 3 (three) times daily as needed. (Patient not taking: Reported on 2/8/2024)      VITAMIN D, CHOLECALCIFEROL, OR Take 1 tablet by mouth daily. (Patient not taking: Reported on 2/22/2024)      losartan 25 MG Oral Tab Take 1 tablet (25 mg total) by mouth daily. (Patient not taking: Reported on 2/22/2024) 30 tablet 1    Dicyclomine HCl 10 MG Oral Cap Take 1 capsule (10 mg total) by mouth 4 (four) times daily before meals and nightly. (Patient not taking: Reported on 2/22/2024) 120 capsule 5    Spacer/Aero-Holding Chambers (VALVED HOLDING CHAMBER) Does not apply Device U UTD (Patient not taking: Reported on 2/8/2024)  0     Allergies:  Allergies   Allergen Reactions    Famotidine SWELLING     Patient states med caused his throat to swell and cause gagging.     Omeprazole SWELLING     Patient states cause a lump in  His throat. ds    Flonase [Fluticasone] NAUSEA ONLY       Past Medical History:   Diagnosis Date    Bone spur     C5/C6    Esophageal reflux     Interstitial cystitis      Irritable bowel syndrome (IBS)     Lipid screening 06/27/2012    Osteoarthrosis, unspecified whether generalized or localized, unspecified site       Past Surgical History:   Procedure Laterality Date    COLONOSCOPY & POLYPECTOMY  04/22/2008    colonoscopy w/ snare polypectomy    KNEE SURGERY      arthroscopic surgery    TONSILLECTOMY        Family History   Problem Relation Age of Onset    Hypertension Father     Cancer Father         bladder and prostate cancer    Hypertension Mother     Ear Problems Mother     Stroke Paternal Grandfather       Social History:   Social History     Socioeconomic History    Marital status: Single   Tobacco Use    Smoking status: Never    Smokeless tobacco: Never   Vaping Use    Vaping Use: Never used   Substance and Sexual Activity    Alcohol use: Yes     Comment: rarely    Drug use: No   Other Topics Concern    Caffeine Concern No        Objective:   Physical Exam  Constitutional:       General: He is not in acute distress.     Appearance: He is not ill-appearing, toxic-appearing or diaphoretic.   HENT:      Right Ear: External ear normal.      Left Ear: External ear normal.   Eyes:      General: No scleral icterus.        Right eye: No discharge.         Left eye: No discharge.   Cardiovascular:      Rate and Rhythm: Normal rate and regular rhythm.      Pulses: Normal pulses.      Heart sounds: No murmur heard.     No friction rub.   Pulmonary:      Effort: No respiratory distress.      Breath sounds: Normal breath sounds. No wheezing or rales.   Abdominal:      General: Abdomen is flat. Bowel sounds are normal. There is no distension.      Palpations: Abdomen is soft. There is no mass.      Tenderness: There is no abdominal tenderness. There is no guarding.   Musculoskeletal:      Cervical back: Normal range of motion and neck supple. No rigidity or tenderness.      Right lower leg: No edema.      Left lower leg: No edema.   Lymphadenopathy:      Cervical: No cervical  adenopathy.   Skin:     Coloration: Skin is not jaundiced or pale.   Neurological:      Mental Status: He is alert.         Assessment & Plan:   (I10) Hypertension, essential  (primary encounter diagnosis)  Plan: d/w pt with his recent diagnosis of hypertensive retinopathy, I told him will need to get bp controlled better.  Will start amlodipine 2.5mg daily for now stating low dose. Will see him back in 1 week. Call back if any side effects.     No orders of the defined types were placed in this encounter.      Meds This Visit:  Requested Prescriptions     Signed Prescriptions Disp Refills    amLODIPine 2.5 MG Oral Tab 30 tablet 0     Sig: Take 1 tablet (2.5 mg total) by mouth daily.       Imaging & Referrals:  None

## 2024-03-07 ENCOUNTER — OFFICE VISIT (OUTPATIENT)
Dept: INTERNAL MEDICINE CLINIC | Facility: CLINIC | Age: 64
End: 2024-03-07

## 2024-03-07 VITALS
HEIGHT: 68 IN | WEIGHT: 178.19 LBS | SYSTOLIC BLOOD PRESSURE: 130 MMHG | DIASTOLIC BLOOD PRESSURE: 78 MMHG | HEART RATE: 72 BPM | OXYGEN SATURATION: 99 % | TEMPERATURE: 99 F | BODY MASS INDEX: 27.01 KG/M2

## 2024-03-07 DIAGNOSIS — R44.9 ABNORMAL PERCEPTION: ICD-10-CM

## 2024-03-07 DIAGNOSIS — R79.89 ELEVATED SERUM CREATININE: ICD-10-CM

## 2024-03-07 DIAGNOSIS — I10 HYPERTENSION, ESSENTIAL: Primary | ICD-10-CM

## 2024-03-07 PROCEDURE — 3075F SYST BP GE 130 - 139MM HG: CPT | Performed by: INTERNAL MEDICINE

## 2024-03-07 PROCEDURE — 99213 OFFICE O/P EST LOW 20 MIN: CPT | Performed by: INTERNAL MEDICINE

## 2024-03-07 PROCEDURE — 3078F DIAST BP <80 MM HG: CPT | Performed by: INTERNAL MEDICINE

## 2024-03-07 PROCEDURE — 3008F BODY MASS INDEX DOCD: CPT | Performed by: INTERNAL MEDICINE

## 2024-03-07 NOTE — PROGRESS NOTES
Subjective:     Patient ID: Aguila Rodríguez is a 63 year old male.    Hypertension  This is a new problem. The current episode started more than 1 month ago. The problem has been gradually improving since onset. The problem is controlled. Pertinent negatives include no chest pain, palpitations, peripheral edema or shortness of breath. There are no associated agents to hypertension. Risk factors for coronary artery disease include male gender. Past treatments include lifestyle changes. The current treatment provides significant improvement. There are no compliance problems.  Hypertensive end-organ damage includes retinopathy. There is no history of angina, CAD/MI, CVA, heart failure or PVD. There is no history of a hypertension causing med or a thyroid problem.       History/Other:   Review of Systems   Constitutional: Negative.    Eyes:         States having problems with depth perception   Respiratory: Negative.  Negative for shortness of breath.    Cardiovascular:  Negative for chest pain, palpitations and leg swelling.   Gastrointestinal: Negative.    Genitourinary:  Positive for frequency. Negative for dysuria and hematuria.     Current Outpatient Medications   Medication Sig Dispense Refill    amLODIPine 2.5 MG Oral Tab Take 1 tablet (2.5 mg total) by mouth daily. (Patient not taking: Reported on 3/7/2024) 30 tablet 0    losartan 25 MG Oral Tab Take by mouth. (Patient not taking: Reported on 2/8/2024)      meclizine 25 MG Oral Tab Take 1 tablet (25 mg total) by mouth 3 (three) times daily as needed. (Patient not taking: Reported on 2/8/2024)      VITAMIN D, CHOLECALCIFEROL, OR Take 1 tablet by mouth daily. (Patient not taking: Reported on 2/22/2024)      Dicyclomine HCl 10 MG Oral Cap Take 1 capsule (10 mg total) by mouth 4 (four) times daily before meals and nightly. (Patient not taking: Reported on 2/22/2024) 120 capsule 5    Spacer/Aero-Holding Chambers (VALVED HOLDING CHAMBER) Does not apply Device U UTD  (Patient not taking: Reported on 2/8/2024)  0    DAILY MULTIPLE VITAMINS Oral Tab Take 1 tablet by mouth daily. (Patient not taking: Reported on 3/7/2024)       Allergies:  Allergies   Allergen Reactions    Famotidine SWELLING     Patient states med caused his throat to swell and cause gagging.     Omeprazole SWELLING     Patient states cause a lump in  His throat. ds    Flonase [Fluticasone] NAUSEA ONLY       Past Medical History:   Diagnosis Date    Bone spur     C5/C6    Esophageal reflux     Interstitial cystitis     Irritable bowel syndrome (IBS)     Lipid screening 06/27/2012    Osteoarthrosis, unspecified whether generalized or localized, unspecified site       Past Surgical History:   Procedure Laterality Date    COLONOSCOPY & POLYPECTOMY  04/22/2008    colonoscopy w/ snare polypectomy    KNEE SURGERY      arthroscopic surgery    TONSILLECTOMY        Family History   Problem Relation Age of Onset    Hypertension Father     Cancer Father         bladder and prostate cancer    Hypertension Mother     Ear Problems Mother     Stroke Paternal Grandfather       Social History:   Social History     Socioeconomic History    Marital status: Single   Tobacco Use    Smoking status: Never     Passive exposure: Never    Smokeless tobacco: Never   Vaping Use    Vaping Use: Never used   Substance and Sexual Activity    Alcohol use: Yes     Comment: rarely    Drug use: No   Other Topics Concern    Caffeine Concern No        Objective:   Physical Exam  Constitutional:       General: He is not in acute distress.     Appearance: He is well-developed. He is not ill-appearing, toxic-appearing or diaphoretic.   HENT:      Head: Normocephalic and atraumatic.      Right Ear: External ear normal.      Left Ear: External ear normal.      Nose: Nose normal.      Mouth/Throat:      Pharynx: No oropharyngeal exudate.   Eyes:      General:         Right eye: No discharge.         Left eye: No discharge.      Conjunctiva/sclera:  Conjunctivae normal.      Pupils: Pupils are equal, round, and reactive to light.   Neck:      Vascular: No JVD.   Cardiovascular:      Rate and Rhythm: Normal rate and regular rhythm.      Heart sounds: Normal heart sounds. No murmur heard.  Pulmonary:      Effort: Pulmonary effort is normal. No respiratory distress.      Breath sounds: Normal breath sounds. No wheezing or rales.   Abdominal:      General: Bowel sounds are normal. There is no distension.      Palpations: Abdomen is soft. There is no mass.      Tenderness: There is no abdominal tenderness. There is no guarding or rebound.   Musculoskeletal:         General: No tenderness. Normal range of motion.      Cervical back: Normal range of motion and neck supple. No rigidity or tenderness.      Right lower leg: No edema.      Left lower leg: No edema.   Lymphadenopathy:      Cervical: No cervical adenopathy.   Skin:     General: Skin is warm and dry.      Findings: No rash.   Neurological:      Mental Status: He is alert and oriented to person, place, and time.         Assessment & Plan:   (I10) Hypertension, essential  (primary encounter diagnosis)  Plan: bp is now in good range both arms taken several times by myself. He actually didn't take amlodipine and had just been doing diet as well as started walking exercises. I told him we will continue then his life style modication treatment and hold off on bp med for now, I will see him back in 2 weeks to recheck his bp.     (R79.89) Elevated serum creatinine  Plan: Basic Metabolic Panel (8)        Will repeat his bmp and told him if creatinine remains elevated, then will refer hm to nephro.  Avoid any nsaids.     (R44.9) Abnormal perception  Plan: Neuro Referral - In Network        Pt complained of having abnormal depth perception, he had seen his optha for ffp and no eye cause for his and was told likely central. No other neurologic symptoms noted. He had normal ct brain last month. I told him to see  neurologist for further evaluatoin. Referral given . Call back if worsens.        No orders of the defined types were placed in this encounter.      Meds This Visit:  Requested Prescriptions      No prescriptions requested or ordered in this encounter       Imaging & Referrals:  None

## 2024-03-12 ENCOUNTER — TELEPHONE (OUTPATIENT)
Dept: INTERNAL MEDICINE CLINIC | Facility: CLINIC | Age: 64
End: 2024-03-12

## 2024-03-12 NOTE — TELEPHONE ENCOUNTER
Patient contacts clinic reporting he has been taking azo of interstitial cystitis flare.  He inquires if this will affect his follow up BMP to check renal function.  RN advised hydration prior to blood draw.  He will do so and repeat labs on Thursday.  No further nahid.

## 2024-03-14 ENCOUNTER — LAB ENCOUNTER (OUTPATIENT)
Dept: LAB | Age: 64
End: 2024-03-14
Attending: INTERNAL MEDICINE
Payer: COMMERCIAL

## 2024-03-14 DIAGNOSIS — R79.89 ELEVATED SERUM CREATININE: ICD-10-CM

## 2024-03-14 LAB
ANION GAP SERPL CALC-SCNC: 2 MMOL/L (ref 0–18)
BUN BLD-MCNC: 16 MG/DL (ref 9–23)
CALCIUM BLD-MCNC: 9.3 MG/DL (ref 8.5–10.1)
CHLORIDE SERPL-SCNC: 110 MMOL/L (ref 98–112)
CO2 SERPL-SCNC: 27 MMOL/L (ref 21–32)
CREAT BLD-MCNC: 1.75 MG/DL
EGFRCR SERPLBLD CKD-EPI 2021: 44 ML/MIN/1.73M2 (ref 60–?)
FASTING STATUS PATIENT QL REPORTED: YES
GLUCOSE BLD-MCNC: 96 MG/DL (ref 70–99)
OSMOLALITY SERPL CALC.SUM OF ELEC: 289 MOSM/KG (ref 275–295)
POTASSIUM SERPL-SCNC: 4.7 MMOL/L (ref 3.5–5.1)
SODIUM SERPL-SCNC: 139 MMOL/L (ref 136–145)

## 2024-03-14 PROCEDURE — 36415 COLL VENOUS BLD VENIPUNCTURE: CPT

## 2024-03-14 PROCEDURE — 80048 BASIC METABOLIC PNL TOTAL CA: CPT

## 2024-03-15 ENCOUNTER — APPOINTMENT (OUTPATIENT)
Dept: ULTRASOUND IMAGING | Age: 64
End: 2024-03-15
Attending: EMERGENCY MEDICINE

## 2024-03-15 ENCOUNTER — APPOINTMENT (OUTPATIENT)
Dept: CT IMAGING | Age: 64
End: 2024-03-15
Attending: EMERGENCY MEDICINE

## 2024-03-15 ENCOUNTER — HOSPITAL ENCOUNTER (EMERGENCY)
Age: 64
Discharge: HOME | End: 2024-03-15
Attending: EMERGENCY MEDICINE

## 2024-03-15 ENCOUNTER — NURSE TRIAGE (OUTPATIENT)
Dept: INTERNAL MEDICINE CLINIC | Facility: CLINIC | Age: 64
End: 2024-03-15

## 2024-03-15 ENCOUNTER — TELEPHONE (OUTPATIENT)
Dept: INTERNAL MEDICINE CLINIC | Facility: CLINIC | Age: 64
End: 2024-03-15

## 2024-03-15 VITALS
TEMPERATURE: 98 F | SYSTOLIC BLOOD PRESSURE: 134 MMHG | OXYGEN SATURATION: 100 % | HEART RATE: 82 BPM | RESPIRATION RATE: 18 BRPM | DIASTOLIC BLOOD PRESSURE: 76 MMHG

## 2024-03-15 DIAGNOSIS — R51.9 ACUTE NONINTRACTABLE HEADACHE, UNSPECIFIED HEADACHE TYPE: Primary | ICD-10-CM

## 2024-03-15 DIAGNOSIS — R42 LIGHT HEADED: ICD-10-CM

## 2024-03-15 DIAGNOSIS — R79.89 ELEVATED SERUM CREATININE: Primary | ICD-10-CM

## 2024-03-15 LAB
ALBUMIN SERPL-MCNC: 3.8 G/DL (ref 3.6–5.1)
ALBUMIN/GLOB SERPL: 1 {RATIO} (ref 1–2.4)
ALP SERPL-CCNC: 69 UNITS/L (ref 45–117)
ALT SERPL-CCNC: 19 UNITS/L
ANION GAP SERPL CALC-SCNC: 10 MMOL/L (ref 7–19)
AST SERPL-CCNC: 14 UNITS/L
BASOPHILS # BLD: 0 K/MCL (ref 0–0.3)
BASOPHILS NFR BLD: 0 %
BILIRUB SERPL-MCNC: 1 MG/DL (ref 0.2–1)
BUN SERPL-MCNC: 19 MG/DL (ref 6–20)
BUN/CREAT SERPL: 14 (ref 7–25)
CALCIUM SERPL-MCNC: 9 MG/DL (ref 8.4–10.2)
CHLORIDE SERPL-SCNC: 110 MMOL/L (ref 97–110)
CO2 SERPL-SCNC: 25 MMOL/L (ref 21–32)
CREAT SERPL-MCNC: 1.38 MG/DL (ref 0.67–1.17)
DEPRECATED RDW RBC: 43.7 FL (ref 39–50)
EGFRCR SERPLBLD CKD-EPI 2021: 58 ML/MIN/{1.73_M2}
EOSINOPHIL # BLD: 0.1 K/MCL (ref 0–0.5)
EOSINOPHIL NFR BLD: 1 %
ERYTHROCYTE [DISTWIDTH] IN BLOOD: 13.2 % (ref 11–15)
FASTING DURATION TIME PATIENT: ABNORMAL H
GLOBULIN SER-MCNC: 3.9 G/DL (ref 2–4)
GLUCOSE SERPL-MCNC: 133 MG/DL (ref 70–99)
HCT VFR BLD CALC: 41.8 % (ref 39–51)
HGB BLD-MCNC: 14.2 G/DL (ref 13–17)
IMM GRANULOCYTES # BLD AUTO: 0 K/MCL (ref 0–0.2)
IMM GRANULOCYTES # BLD: 0 %
LYMPHOCYTES # BLD: 1.7 K/MCL (ref 1–4)
LYMPHOCYTES NFR BLD: 22 %
MAGNESIUM SERPL-MCNC: 2.3 MG/DL (ref 1.7–2.4)
MCH RBC QN AUTO: 30.8 PG (ref 26–34)
MCHC RBC AUTO-ENTMCNC: 34 G/DL (ref 32–36.5)
MCV RBC AUTO: 90.7 FL (ref 78–100)
MONOCYTES # BLD: 0.7 K/MCL (ref 0.3–0.9)
MONOCYTES NFR BLD: 8 %
NEUTROPHILS # BLD: 5.3 K/MCL (ref 1.8–7.7)
NEUTROPHILS NFR BLD: 69 %
NRBC BLD MANUAL-RTO: 0 /100 WBC
PLATELET # BLD AUTO: 244 K/MCL (ref 140–450)
POTASSIUM SERPL-SCNC: 4.1 MMOL/L (ref 3.4–5.1)
PROT SERPL-MCNC: 7.7 G/DL (ref 6.4–8.2)
RBC # BLD: 4.61 MIL/MCL (ref 4.5–5.9)
SODIUM SERPL-SCNC: 141 MMOL/L (ref 135–145)
WBC # BLD: 7.8 K/MCL (ref 4.2–11)

## 2024-03-15 PROCEDURE — 10002807 HB RX 258: Performed by: EMERGENCY MEDICINE

## 2024-03-15 PROCEDURE — 85025 COMPLETE CBC W/AUTO DIFF WBC: CPT | Performed by: EMERGENCY MEDICINE

## 2024-03-15 PROCEDURE — 83735 ASSAY OF MAGNESIUM: CPT | Performed by: EMERGENCY MEDICINE

## 2024-03-15 PROCEDURE — 96361 HYDRATE IV INFUSION ADD-ON: CPT

## 2024-03-15 PROCEDURE — 96360 HYDRATION IV INFUSION INIT: CPT

## 2024-03-15 PROCEDURE — 80053 COMPREHEN METABOLIC PANEL: CPT | Performed by: EMERGENCY MEDICINE

## 2024-03-15 PROCEDURE — 70450 CT HEAD/BRAIN W/O DYE: CPT

## 2024-03-15 PROCEDURE — 76775 US EXAM ABDO BACK WALL LIM: CPT

## 2024-03-15 PROCEDURE — 99284 EMERGENCY DEPT VISIT MOD MDM: CPT

## 2024-03-15 RX ORDER — DIPHENHYDRAMINE HYDROCHLORIDE 50 MG/ML
25 INJECTION INTRAMUSCULAR; INTRAVENOUS ONCE
Status: DISCONTINUED | OUTPATIENT
Start: 2024-03-15 | End: 2024-03-15

## 2024-03-15 RX ORDER — PROCHLORPERAZINE EDISYLATE 5 MG/ML
5 INJECTION INTRAMUSCULAR; INTRAVENOUS ONCE
Status: DISCONTINUED | OUTPATIENT
Start: 2024-03-15 | End: 2024-03-15

## 2024-03-15 RX ADMIN — SODIUM CHLORIDE 1000 ML: 9 INJECTION, SOLUTION INTRAVENOUS at 16:35

## 2024-03-15 SDOH — SOCIAL STABILITY: SOCIAL INSECURITY: HOW OFTEN DOES ANYONE, INCLUDING FAMILY AND FRIENDS, PHYSICALLY HURT YOU?: NEVER

## 2024-03-15 SDOH — SOCIAL STABILITY: SOCIAL INSECURITY: HOW OFTEN DOES ANYONE, INCLUDING FAMILY AND FRIENDS, INSULT OR TALK DOWN TO YOU?: NEVER

## 2024-03-15 SDOH — SOCIAL STABILITY: SOCIAL INSECURITY: HOW OFTEN DOES ANYONE, INCLUDING FAMILY AND FRIENDS, THREATEN YOU WITH HARM?: NEVER

## 2024-03-15 SDOH — SOCIAL STABILITY: SOCIAL INSECURITY: HOW OFTEN DOES ANYONE, INCLUDING FAMILY AND FRIENDS, SCREAM OR CURSE AT YOU?: NEVER

## 2024-03-15 NOTE — TELEPHONE ENCOUNTER
Action Requested: Summary for Provider     []  Critical Lab, Recommendations Needed  [x] Need Additional Advice  []   FYI    []   Need Orders  [] Need Medications Sent to Pharmacy  []  Other     SUMMARY: Spoke with patient about results and plan. He states understanding but wants MD to know he has been feeling dizzy the last few days. B/P at home 147/80, 148/78 pulse 71-72 range. He is able to get up and walk but must have his cane and go slowly. Says that after watching TV symptoms are worse. Has retinopathy and says symptoms are same as when first DX with this.     Per recommendations of protocol, patient to be seen today - Please advise if you would like to see or other plan for care today. IC/ER?     Patient taking tylenol a couple times a day, has been urinating every two hours or more freqent. Urine color clear or yellow. Did take Azo a few days ago, minimal help.     Patient has cystitis and when he eats fruit this flares, he did have kiwi and other fruit this week which he thinks is causing some of this.       Reason for call: Dizziness  Onset: few day now       Reason for Disposition   Lightheadedness (dizziness) present now, after 2 hours of rest and fluids    Protocols used: Dizziness-A-OH

## 2024-03-15 NOTE — TELEPHONE ENCOUNTER
Ptnakita called from ER in [place].  He says they want to perform blood work but he just had blood work done yesterday.  RN encouraged patient to comply with the acute work up in the ER.  Based on his symptoms there could be acute changes that need to be evaluated.  He verbalized understanding.

## 2024-03-15 NOTE — TELEPHONE ENCOUNTER
Would recommend to go to ER given his current symptoms that he now even needs to use cane to ambulate. May need to repeat his scan or mri his brain given above symptoms.

## 2024-03-15 NOTE — TELEPHONE ENCOUNTER
ER f/u 3/16    HOLLY Esqueda pt was called and inform of your message below and he verbalized understanding. Pt stated that he will talk to his fiance and see if she can take him to ER. Pt stated that he will not be going to [facility]. Pt stated that he might go to [facility] ER. Pt was inform he should not drive if he feels dizzy pt verbalized understanding.

## 2024-03-16 LAB
RAINBOW EXTRA TUBES HOLD SPECIMEN: NORMAL

## 2024-03-18 NOTE — TELEPHONE ENCOUNTER
Patient following up with PCP on 3/21.    Future Appointments   Date Time Provider Department Center   3/21/2024 11:30 AM Justin Esqueda MD [facility]   4/15/2024  9:00 AM [facility]

## 2024-03-21 ENCOUNTER — OFFICE VISIT (OUTPATIENT)
Dept: INTERNAL MEDICINE CLINIC | Facility: CLINIC | Age: 64
End: 2024-03-21
Payer: COMMERCIAL

## 2024-03-21 ENCOUNTER — HOSPITAL ENCOUNTER (OUTPATIENT)
Dept: GENERAL RADIOLOGY | Age: 64
Discharge: HOME | End: 2024-03-21
Attending: INTERNAL MEDICINE
Payer: COMMERCIAL

## 2024-03-21 VITALS
OXYGEN SATURATION: 97 % | BODY MASS INDEX: 26.6 KG/M2 | HEIGHT: 68 IN | TEMPERATURE: 98 F | DIASTOLIC BLOOD PRESSURE: 76 MMHG | WEIGHT: 175.5 LBS | SYSTOLIC BLOOD PRESSURE: 128 MMHG | HEART RATE: 67 BPM

## 2024-03-21 DIAGNOSIS — R42 DIZZINESS: ICD-10-CM

## 2024-03-21 DIAGNOSIS — G89.29 CHRONIC RIGHT-SIDED THORACIC BACK PAIN: ICD-10-CM

## 2024-03-21 DIAGNOSIS — R79.89 ELEVATED SERUM CREATININE: ICD-10-CM

## 2024-03-21 DIAGNOSIS — I10 HYPERTENSION, ESSENTIAL: Primary | ICD-10-CM

## 2024-03-21 DIAGNOSIS — M54.6 CHRONIC RIGHT-SIDED THORACIC BACK PAIN: ICD-10-CM

## 2024-03-21 DIAGNOSIS — N30.10 INTERSTITIAL CYSTITIS: ICD-10-CM

## 2024-03-21 DIAGNOSIS — F41.1 GAD (GENERALIZED ANXIETY DISORDER): ICD-10-CM

## 2024-03-21 PROCEDURE — 71101 X-RAY EXAM UNILAT RIBS/CHEST: CPT | Performed by: INTERNAL MEDICINE

## 2024-03-21 PROCEDURE — 99213 OFFICE O/P EST LOW 20 MIN: CPT | Performed by: INTERNAL MEDICINE

## 2024-03-21 NOTE — PROGRESS NOTES
Subjective:     Patient ID: Aguila Rodríguez is a 63 year old male.    Patient here today for ffup; was sent to ER last week due to worsening dizziness.  He went to [facility] ER,  ct brain done negative;  creatinine was improving down to 1.3 from 1.7 few days before.   His bp has been overall stable without bp meds. Had tried losartan 25mg for few days a month ago but had to stop due to not tolerating med. He didn't start amlodipine 2.5mg given as replacement for losartan due to his concern he will not tolerate.         History/Other:   Review of Systems   Constitutional: Negative.    Respiratory: Negative.     Cardiovascular: Negative.    Gastrointestinal: Negative.    Genitourinary:  Positive for dysuria and frequency. Negative for hematuria.   Musculoskeletal:  Positive for back pain.   Neurological:  Positive for dizziness.     Current Outpatient Medications   Medication Sig Dispense Refill    amLODIPine 2.5 MG Oral Tab Take 1 tablet (2.5 mg total) by mouth daily. (Patient not taking: Reported on 3/7/2024) 30 tablet 0    meclizine 25 MG Oral Tab Take 1 tablet (25 mg total) by mouth 3 (three) times daily as needed. (Patient not taking: Reported on 2/8/2024)      VITAMIN D, CHOLECALCIFEROL, OR Take 1 tablet by mouth daily. (Patient not taking: Reported on 2/22/2024)      Dicyclomine HCl 10 MG Oral Cap Take 1 capsule (10 mg total) by mouth 4 (four) times daily before meals and nightly. (Patient not taking: Reported on 2/22/2024) 120 capsule 5    Spacer/Aero-Holding Chambers (VALVED HOLDING CHAMBER) Does not apply Device U UTD (Patient not taking: Reported on 2/8/2024)  0    DAILY MULTIPLE VITAMINS Oral Tab Take 1 tablet by mouth daily. (Patient not taking: Reported on 3/7/2024)       Allergies:  Allergies   Allergen Reactions    Famotidine SWELLING     Patient states med caused his throat to swell and cause gagging.     Omeprazole SWELLING     Patient states cause a lump in  His throat. ds    Flonase [Fluticasone]  NAUSEA ONLY       Past Medical History:   Diagnosis Date    Bone spur     C5/C6    Esophageal reflux     Interstitial cystitis     Irritable bowel syndrome (IBS)     Lipid screening 06/27/2012    Osteoarthrosis, unspecified whether generalized or localized, unspecified site       Past Surgical History:   Procedure Laterality Date    COLONOSCOPY & POLYPECTOMY  04/22/2008    colonoscopy w/ snare polypectomy    KNEE SURGERY      arthroscopic surgery    TONSILLECTOMY        Family History   Problem Relation Age of Onset    Hypertension Father     Cancer Father         bladder and prostate cancer    Hypertension Mother     Ear Problems Mother     Stroke Paternal Grandfather       Social History:   Social History     Socioeconomic History    Marital status: Single   Tobacco Use    Smoking status: Never     Passive exposure: Never    Smokeless tobacco: Never   Vaping Use    Vaping Use: Never used   Substance and Sexual Activity    Alcohol use: Yes     Comment: rarely    Drug use: No   Other Topics Concern    Caffeine Concern No        Objective:   Physical Exam  Constitutional:       General: He is not in acute distress.     Appearance: He is well-developed. He is not ill-appearing, toxic-appearing or diaphoretic.   HENT:      Head: Normocephalic and atraumatic.      Right Ear: Tympanic membrane, ear canal and external ear normal.      Left Ear: Tympanic membrane, ear canal and external ear normal.      Nose: Nose normal.      Mouth/Throat:      Pharynx: No oropharyngeal exudate.   Eyes:      General:         Right eye: No discharge.         Left eye: No discharge.      Conjunctiva/sclera: Conjunctivae normal.      Pupils: Pupils are equal, round, and reactive to light.   Neck:      Vascular: No JVD.   Cardiovascular:      Rate and Rhythm: Normal rate and regular rhythm.      Heart sounds: Normal heart sounds. No murmur heard.  Pulmonary:      Effort: Pulmonary effort is normal. No respiratory distress.      Breath sounds:  Normal breath sounds. No wheezing or rales.   Abdominal:      General: Bowel sounds are normal. There is no distension.      Palpations: Abdomen is soft. There is no mass.      Tenderness: There is no abdominal tenderness. There is no guarding or rebound.   Musculoskeletal:         General: No tenderness. Normal range of motion.      Cervical back: Normal range of motion and neck supple. No rigidity or tenderness.      Right lower leg: No edema.      Left lower leg: No edema.   Lymphadenopathy:      Cervical: No cervical adenopathy.   Skin:     General: Skin is warm and dry.      Findings: No rash.   Neurological:      Mental Status: He is alert and oriented to person, place, and time.   Psychiatric:         Attention and Perception: Attention normal.         Mood and Affect: Mood is anxious.         Speech: Speech normal.         Behavior: Behavior normal. Behavior is cooperative.         Thought Content: Thought content is not paranoid or delusional. Thought content does not include homicidal or suicidal ideation.         Assessment & Plan:   (I10) Hypertension, essential  (primary encounter diagnosis)  Plan: bp still good without bp med since he has been hesitant before due to side effects; had been watching his diet for salt and had started some exercises.     (R79.89) Elevated serum creatinine  Plan: going down a 1.3 now in ER; continue to avoid nsaids and maintain good hydration. He will keep apptment with nephro.     (R42) Dizziness  Plan: persistent dizziness and 2 negative ct brain before, pt has apptment to see neuro.     (N30.10) Interstitial cystitis  Plan: Urology Referral - [place] ([facility])        Pt continues to have urinary symptoms, ua done before negative; he had been diagnosed years ago with interstitial cystitis but last time seen by uro was 10 yrs ago. I told him he needs to see uroloigst and gave him referral.     (M54.6,  G89.29) Chronic right-sided thoracic back pain  Plan: XR  RIBS WITH CHEST (3 VIEWS), RIGHT          (CPT=71101)        Pt had complained of chronic right back pain for years , noted whenever he leans back on his back. Will get xray of his ribs on right.     (F41.1) CAITIE (generalized anxiety disorder)  Plan: [facility] Referral - In Network        Pt had asked to see  psych for his anxiety; [place] to [facility]      Pt also reminded that he is overdue for his colonoscopy and needs to see GI to get this done.     No orders of the defined types were placed in this encounter.      Meds This Visit:  Requested Prescriptions      No prescriptions requested or ordered in this encounter       Imaging & Referrals:  UROLOGY - INTERNAL  OP REFERRAL TO [facility]

## 2024-03-23 ENCOUNTER — TELEPHONE (OUTPATIENT)
Dept: INTERNAL MEDICINE CLINIC | Facility: CLINIC | Age: 64
End: 2024-03-23

## 2024-03-23 DIAGNOSIS — M54.6 CHRONIC RIGHT-SIDED THORACIC BACK PAIN: Primary | ICD-10-CM

## 2024-03-23 DIAGNOSIS — G89.29 CHRONIC RIGHT-SIDED THORACIC BACK PAIN: Primary | ICD-10-CM

## 2024-04-05 ENCOUNTER — OFFICE VISIT (OUTPATIENT)
Dept: NEPHROLOGY | Facility: CLINIC | Age: 64
End: 2024-04-05

## 2024-04-05 VITALS
DIASTOLIC BLOOD PRESSURE: 58 MMHG | HEIGHT: 68 IN | HEART RATE: 72 BPM | BODY MASS INDEX: 26.37 KG/M2 | SYSTOLIC BLOOD PRESSURE: 120 MMHG | WEIGHT: 174 LBS

## 2024-04-05 DIAGNOSIS — N18.32 STAGE 3B CHRONIC KIDNEY DISEASE (HCC): Primary | ICD-10-CM

## 2024-04-05 PROCEDURE — 99245 OFF/OP CONSLTJ NEW/EST HI 55: CPT | Performed by: INTERNAL MEDICINE

## 2024-04-05 PROCEDURE — 3078F DIAST BP <80 MM HG: CPT | Performed by: INTERNAL MEDICINE

## 2024-04-05 PROCEDURE — 3074F SYST BP LT 130 MM HG: CPT | Performed by: INTERNAL MEDICINE

## 2024-04-05 PROCEDURE — 3008F BODY MASS INDEX DOCD: CPT | Performed by: INTERNAL MEDICINE

## 2024-04-06 NOTE — PATIENT INSTRUCTIONS
Please check your blood pressures and heart rates daily and call in 1 week.  Please do laboratory studies as ordered.   Single Mechanical/Accidental Fall

## 2024-04-06 NOTE — PROGRESS NOTES
04/05/24        Patient: Aguila Rodríguez   YOB: 1960   Date of Visit: 4/5/2024       Dear  Dr. Yin MD,      Thank you for referring Aguila Rodríguez to my practice.  Please find my assessment and plan below.      As you know he is a 63-year-old male with a history of possible hypertension, irritable bowel syndrome, history of interstitial cystitis who I now the pleasure of seeing for what may be chronic kidney disease stage III.  Laboratory studies have been reviewed in [facility] and Care Everywhere.  He had a creatinine 1.22 back in February 2017.  There is a gap of 7 years in which she did not have any laboratory studies up until February 23, 2024 when his creatinine was 1.55.  Repeat creatinine was 1.75 on March 14, 2024 with an estimated GFR of 44 cc/min and renal consultation has now been advised.    Of note is the patient had a gap of 7 years in which she did not see a physician.  He was not really checking his blood pressures.  Presented to the emergency room on January 11, 2024.  Blood pressure was 171/87.  Presented with dizziness.  Was thought to have acute labyrinth-itis.  CT scan of the brain was nonrevealing.  Creatinine that day was 1.24.  He has some visual issues as well.  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  He then saw you and losartan 25 mg daily was started on February 2024.  He states he did not tolerate losartan and felt dizzy with headaches and light sensitivity.  He again went to the emergency room at [facility] on March 15, 2024 with severe headache.  His blood pressure was 167/78.  Repeat creatinine was actually better down to 1.38.  A renal ultrasound was also performed which revealed normal-sized kidney with bilateral renal cysts.  A 7 mm nonobstructing right renal calculi was noted but otherwise was unremarkable.    The patient states that he has been making a concerted effort to alter his lifestyle to  lose weight.  States he is lost close to 20 pounds since January.  As result of this his blood pressure has improved and he is currently on no antihypertensive medications.    Past medical history is significant for irritable bowel syndrome and interstitial cystitis.  He had seen a urologist in the past and received some treatment which did not help.  Therefore he has just been living with this.  States he gets intermittent dysuria and frequency.  Irritable bowel symptoms controlled with Bentyl.  Medications are as listed.  Denies any recent significant use of nonsteroidals.  Social history non-smoker.  Family is notable for father had bladder and prostate cancer.  Hypertension is common in his family.  But no renal pathology.  Review of system at the present time the patient states he is feeling well without any chest pain, shortness of breath, GI or urinary tract symptoms.  Currently no dizziness or headaches.    On physical exam his blood pressure is 120/58 with a pulse of 72 and he weighed 174 pounds.  His neck was supple without JVD.  Lungs are clear.  Heart revealed a regular rate and rhythm with an S4 but no gallops, murmurs or rubs.  Abdomen soft, flat, nontender without organomegaly, masses or bruits.  Extremities revealed no clubbing, cyanosis or edema.    I therefore informed the patient did have an elevated creatinine.  It had increased to 1.75 on March 14, 2024 but then the following day on March 15, 2024 at an outside hospital his creatinine was better down to 1.38.  It is hard to tell when his creatinine may have started to creep up as there was a gap of 7 years in which laboratory studies were not done.  Patient may also have had hypertension for period of time without being aware of this.  Suspect though that he may have had significant hypertension if he truly has had a hypertensive retinal bleed.  His blood pressures though now seem to be better with just weight reduction.  For completion sake  will repeat a CBC, renal panel, urinalysis, urine for microalbumin, urine for Bence-Nam protein, sed rate and connective tissue profile.  Reinforced the importance of following a low-salt diet.  Avoid nonsteroidals.  Further impressions and recommendations will be forthcoming after reviewing the above.  Check blood pressures daily and record.    Thank you very much for allowing me to participate in the care of your patient.  If you have any questions please feel free to call.        Sincerely,   Rio Mcfarland MD   [facility], [facility], [place]  [address]  [place] [address]    Document electronically generated by:  Rio Mcfarland MD

## 2024-04-11 ENCOUNTER — OFFICE VISIT (OUTPATIENT)
Dept: SURGERY | Facility: CLINIC | Age: 64
End: 2024-04-11
Payer: COMMERCIAL

## 2024-04-11 VITALS — HEART RATE: 68 BPM | SYSTOLIC BLOOD PRESSURE: 126 MMHG | DIASTOLIC BLOOD PRESSURE: 68 MMHG

## 2024-04-11 DIAGNOSIS — N20.0 RIGHT KIDNEY STONE: ICD-10-CM

## 2024-04-11 DIAGNOSIS — Z12.5 SCREENING PSA (PROSTATE SPECIFIC ANTIGEN): ICD-10-CM

## 2024-04-11 DIAGNOSIS — N32.81 OAB (OVERACTIVE BLADDER): Primary | ICD-10-CM

## 2024-04-11 PROCEDURE — 3074F SYST BP LT 130 MM HG: CPT | Performed by: UROLOGY

## 2024-04-11 PROCEDURE — 3078F DIAST BP <80 MM HG: CPT | Performed by: UROLOGY

## 2024-04-11 PROCEDURE — 99244 OFF/OP CNSLTJ NEW/EST MOD 40: CPT | Performed by: UROLOGY

## 2024-04-11 RX ORDER — SOLIFENACIN SUCCINATE 5 MG/1
5 TABLET, FILM COATED ORAL DAILY
Qty: 30 TABLET | Refills: 1 | Status: SHIPPED | OUTPATIENT
Start: 2024-04-11

## 2024-04-11 NOTE — PROGRESS NOTES
[facility]  Initial Office Consultation    HPI:   Aguila Rodríguez is a 63 year old male here today for consultation at the request of, and a copy of this note will be sent to, Justin Esqueda MD.    1.  Interstitial cystitis/overactive bladder  2.  Right kidney stone  3.  PSA screening  Patient reports seeing urologist in 2013 for evaluation of urinary frequency.  He was reportedly started on Detrol without any improvement in symptoms.    He then was put on Elmiron for 5 months without significant change in his symptoms.    He reportedly underwent cystoscopy in October 2013 and was told he has interstitial cystitis.  He was reportedly given an intravesical instillation that also did not significantly improve his symptoms.    Patient has not seen a urologist since then or generally followed up with physicians for 5 years.    He was recently in the ER at [facility] March 15, 2024 with severe headache.  He had a renal ultrasound which revealed a 7 mm nonobstructing right kidney stone.    Urinalysis 2/8/2024 was negative for microscopic hematuria or signs of UTI.    Regarding his urination symptoms: He reports urinary frequency every 1-2 hours, urgency, and occasional urge incontinence.  He occasionally wears a pad.  He denies any significant notable pain with bladder filling.  He occasionally has some dysuria.  No gross hematuria.    He reports symptoms are worse with taking vitamin C and vitamin D.    His IPSS is 14 (1/3/2/3/2/1/2) with a quality-of-life score of 3.    Bladder scan PVR 0 mL.    Screening PSA level from 5/3/2024 was normal at 1.47 ng/mL.      PAST MEDICAL HISTORY: Hypertension.  CKD.  Irritable bowel syndrome.  GERD.  Osteoarthritis.    PAST SURGICAL HISTORY: Tonsillectomy and knee arthroscopy.    SOCIAL HISTORY: Patient is engaged.  No children.  No smoking or illicit drug use.  Currently does not drink any alcohol.  Retired .     Family History    Problem Relation Age of Onset    Hypertension Father     Cancer Father         bladder and prostate cancer    Hypertension Mother     Ear Problems Mother     Stroke Paternal Grandfather      Allergies: Famotidine, Omeprazole, and Flonase [fluticasone]      REVIEW OF SYSTEMS:  Pertinent positives and negatives per HPI. A 12-point ROS was performed and is otherwise negative.       EXAM:  /68 (BP Location: Left arm, Patient Position: Sitting, Cuff Size: adult)   Pulse 68     Physical Exam  Constitutional:       Appearance: He is well-developed.   HENT:      Head: Normocephalic.   Eyes:      General: No scleral icterus.  Cardiovascular:      Rate and Rhythm: Normal rate.   Pulmonary:      Effort: Pulmonary effort is normal.   Abdominal:      General: There is no distension.      Palpations: Abdomen is soft.      Tenderness: There is no abdominal tenderness.   Genitourinary:     Penis: Circumcised.       Testes: Normal.   Skin:     General: Skin is warm and dry.   Neurological:      Mental Status: He is alert and oriented to person, place, and time.   Psychiatric:         Mood and Affect: Mood normal.         Behavior: Behavior normal.       LABS:  See HPI for details.      IMAGING:  Report of outside renal ultrasound from 3/15/2024 revealing a 7 mm nonobstructing right kidney stone.      IMPRESSION:  63 year old male with what sounds like overactive bladder.  He was reportedly diagnosed with interstitial cystitis in 2013 although symptoms do not seem typical.    Recent imaging revealing a 7 mm right obstructing kidney stone.  Patient is asymptomatic from that standpoint.    Findings reviewed with patient at length.  We discussed relevant anatomy and physiology.    Regarding his voiding symptoms: I discussed with him that I feel like his symptoms are more related to overactive bladder than interstitial cystitis.  Management options reviewed including behavioral and lifestyle changes, antimuscarinic or beta 3  agonist therapy, bladder Botox injections.  Will benefits, risks, side effects, and alternatives were reviewed.  Patient agreed to receive a prescription for Solifenacin 5 mg daily and will consider trying it.  If he starts the medication I would like him to follow-up in 4 to 6 weeks for an update on his symptoms and bladder scan/PVR.    We then discussed the nonobstructing asymptomatic 7 mm right kidney stone.  Management options reviewed including conservative management, ESWL, or ureteroscopy laser lithotripsy.  Rationale and approach, benefits, risks, side effects alternatives of treatment were discussed.  Patient electing conservative management for the time being.    Informational handouts were provided to the patient regarding interstitial cystitis and kidney stones.    All questions answered.      PLAN:  1.  Trial of Solifenacin 5 mg daily for management of overactive bladder symptoms.    2.  Patient electing conservative management of an asymptomatic nonobstructing 7 mm right kidney stone.    If patient starts Solifenacin, I would like him to return for follow-up in 4 to 6 weeks for an update on symptoms and bladder scan/PVR.    Franck Braxton MD  4/11/2024

## 2024-05-10 ENCOUNTER — TELEPHONE (OUTPATIENT)
Dept: NEPHROLOGY | Facility: CLINIC | Age: 64
End: 2024-05-10

## 2024-05-10 NOTE — TELEPHONE ENCOUNTER
Rn spoke to patient  verified provided CPT/ Diagnosis code N 18.32.,advised to call for any concerns ,pt verbalized understanding.

## 2024-05-10 NOTE — TELEPHONE ENCOUNTER
Patient calling states needs codes for blood work and urinalysis. States due to insurance to see if they will cover. Please call.

## 2024-06-17 ENCOUNTER — LAB ENCOUNTER (OUTPATIENT)
Dept: LAB | Age: 64
End: 2024-06-17
Attending: INTERNAL MEDICINE

## 2024-06-17 DIAGNOSIS — N18.32 STAGE 3B CHRONIC KIDNEY DISEASE (HCC): ICD-10-CM

## 2024-06-17 LAB
ALBUMIN SERPL-MCNC: 3.5 G/DL (ref 3.4–5)
ANION GAP SERPL CALC-SCNC: 5 MMOL/L (ref 0–18)
BASOPHILS # BLD AUTO: 0.03 X10(3) UL (ref 0–0.2)
BASOPHILS NFR BLD AUTO: 0.5 %
BILIRUB UR QL STRIP.AUTO: NEGATIVE
BUN BLD-MCNC: 16 MG/DL (ref 9–23)
C3 SERPL-MCNC: 112 MG/DL (ref 90–180)
C4 SERPL-MCNC: 32 MG/DL (ref 10–40)
CALCIUM BLD-MCNC: 8.9 MG/DL (ref 8.5–10.1)
CHLORIDE SERPL-SCNC: 112 MMOL/L (ref 98–112)
CLARITY UR REFRACT.AUTO: CLEAR
CO2 SERPL-SCNC: 26 MMOL/L (ref 21–32)
COLOR UR AUTO: YELLOW
CREAT BLD-MCNC: 1.05 MG/DL
CREAT UR-SCNC: 198 MG/DL
CRP SERPL-MCNC: <0.29 MG/DL (ref ?–0.3)
EGFRCR SERPLBLD CKD-EPI 2021: 81 ML/MIN/1.73M2 (ref 60–?)
EOSINOPHIL # BLD AUTO: 0.07 X10(3) UL (ref 0–0.7)
EOSINOPHIL NFR BLD AUTO: 1.1 %
ERYTHROCYTE [DISTWIDTH] IN BLOOD BY AUTOMATED COUNT: 13.1 %
ERYTHROCYTE [SEDIMENTATION RATE] IN BLOOD: 16 MM/HR
GLUCOSE BLD-MCNC: 90 MG/DL (ref 70–99)
GLUCOSE UR STRIP.AUTO-MCNC: NORMAL MG/DL
HCT VFR BLD AUTO: 38.5 %
HGB BLD-MCNC: 12.9 G/DL
IMM GRANULOCYTES # BLD AUTO: 0.01 X10(3) UL (ref 0–1)
IMM GRANULOCYTES NFR BLD: 0.2 %
KETONES UR STRIP.AUTO-MCNC: NEGATIVE MG/DL
LEUKOCYTE ESTERASE UR QL STRIP.AUTO: NEGATIVE
LYMPHOCYTES # BLD AUTO: 2.04 X10(3) UL (ref 1–4)
LYMPHOCYTES NFR BLD AUTO: 32.9 %
MCH RBC QN AUTO: 30.5 PG (ref 26–34)
MCHC RBC AUTO-ENTMCNC: 33.5 G/DL (ref 31–37)
MCV RBC AUTO: 91 FL
MICROALBUMIN UR-MCNC: 1.71 MG/DL
MICROALBUMIN/CREAT 24H UR-RTO: 8.6 UG/MG (ref ?–30)
MONOCYTES # BLD AUTO: 0.47 X10(3) UL (ref 0.1–1)
MONOCYTES NFR BLD AUTO: 7.6 %
NEUTROPHILS # BLD AUTO: 3.58 X10 (3) UL (ref 1.5–7.7)
NEUTROPHILS # BLD AUTO: 3.58 X10(3) UL (ref 1.5–7.7)
NEUTROPHILS NFR BLD AUTO: 57.7 %
NITRITE UR QL STRIP.AUTO: NEGATIVE
OSMOLALITY SERPL CALC.SUM OF ELEC: 297 MOSM/KG (ref 275–295)
PH UR STRIP.AUTO: 5 [PH] (ref 5–8)
PHOSPHATE SERPL-MCNC: 3.3 MG/DL (ref 2.5–4.9)
PLATELET # BLD AUTO: 198 10(3)UL (ref 150–450)
POTASSIUM SERPL-SCNC: 4 MMOL/L (ref 3.5–5.1)
PROT UR STRIP.AUTO-MCNC: NEGATIVE MG/DL
PROT UR-MCNC: 14.5 MG/DL
RBC # BLD AUTO: 4.23 X10(6)UL
RBC UR QL AUTO: NEGATIVE
RHEUMATOID FACT SERPL-ACNC: <10 IU/ML (ref ?–15)
SODIUM SERPL-SCNC: 143 MMOL/L (ref 136–145)
SP GR UR STRIP.AUTO: 1.02 (ref 1–1.03)
UROBILINOGEN UR STRIP.AUTO-MCNC: NORMAL MG/DL
WBC # BLD AUTO: 6.2 X10(3) UL (ref 4–11)

## 2024-06-17 PROCEDURE — 86038 ANTINUCLEAR ANTIBODIES: CPT

## 2024-06-17 PROCEDURE — 82570 ASSAY OF URINE CREATININE: CPT

## 2024-06-17 PROCEDURE — 82784 ASSAY IGA/IGD/IGG/IGM EACH: CPT

## 2024-06-17 PROCEDURE — 36415 COLL VENOUS BLD VENIPUNCTURE: CPT

## 2024-06-17 PROCEDURE — 84165 PROTEIN E-PHORESIS SERUM: CPT

## 2024-06-17 PROCEDURE — 86334 IMMUNOFIX E-PHORESIS SERUM: CPT

## 2024-06-17 PROCEDURE — 86335 IMMUNFIX E-PHORSIS/URINE/CSF: CPT

## 2024-06-17 PROCEDURE — 85025 COMPLETE CBC W/AUTO DIFF WBC: CPT

## 2024-06-17 PROCEDURE — 86160 COMPLEMENT ANTIGEN: CPT

## 2024-06-17 PROCEDURE — 81003 URINALYSIS AUTO W/O SCOPE: CPT

## 2024-06-17 PROCEDURE — 85652 RBC SED RATE AUTOMATED: CPT

## 2024-06-17 PROCEDURE — 84166 PROTEIN E-PHORESIS/URINE/CSF: CPT

## 2024-06-17 PROCEDURE — 82043 UR ALBUMIN QUANTITATIVE: CPT

## 2024-06-17 PROCEDURE — 86431 RHEUMATOID FACTOR QUANT: CPT

## 2024-06-17 PROCEDURE — 84156 ASSAY OF PROTEIN URINE: CPT

## 2024-06-17 PROCEDURE — 80069 RENAL FUNCTION PANEL: CPT

## 2024-06-17 PROCEDURE — 86140 C-REACTIVE PROTEIN: CPT

## 2024-06-18 LAB
IGA SERPL-MCNC: 251.4 MG/DL (ref 40–350)
IGM SERPL-MCNC: 340 MG/DL (ref 50–300)
IMMUNOGLOBULIN PNL SER-MCNC: 1044 MG/DL (ref 650–1600)

## 2024-06-20 LAB — NUCLEAR IGG TITR SER IF: NEGATIVE {TITER}

## 2024-06-21 ENCOUNTER — OFFICE VISIT (OUTPATIENT)
Dept: NEPHROLOGY | Facility: CLINIC | Age: 64
End: 2024-06-21

## 2024-06-21 VITALS
HEART RATE: 72 BPM | HEIGHT: 68 IN | SYSTOLIC BLOOD PRESSURE: 134 MMHG | BODY MASS INDEX: 25.16 KG/M2 | WEIGHT: 166 LBS | DIASTOLIC BLOOD PRESSURE: 54 MMHG

## 2024-06-21 DIAGNOSIS — N17.9 AKI (ACUTE KIDNEY INJURY) (HCC): Primary | ICD-10-CM

## 2024-06-21 PROCEDURE — 3008F BODY MASS INDEX DOCD: CPT | Performed by: INTERNAL MEDICINE

## 2024-06-21 PROCEDURE — 3075F SYST BP GE 130 - 139MM HG: CPT | Performed by: INTERNAL MEDICINE

## 2024-06-21 PROCEDURE — 3078F DIAST BP <80 MM HG: CPT | Performed by: INTERNAL MEDICINE

## 2024-06-21 PROCEDURE — 99214 OFFICE O/P EST MOD 30 MIN: CPT | Performed by: INTERNAL MEDICINE

## 2024-06-21 NOTE — PROGRESS NOTES
06/21/24        Patient: Aguila Rodríguez   YOB: 1960   Date of Visit: 6/21/2024       Dear  Dr. Yin MD,      Thank you for referring Aguila Rodríguez to my practice.  Please find my assessment and plan below.      As you know he is a 63-year-old male with a history of borderline hypertension, irritable bowel syndrome, history of interstitial cystitis who I now had the pleasure of seeing for what may have been acute renal failure now resolved.  As you know his creatinine has been creeping up to 1.75 in March 2024.  He states since January of this year he has been trying to lose weight.  When I first saw him in April 2024 he weighed 174 pounds.  Today's down to 166 pounds.  He states he is no longer taking any blood pressure medications.    The patient just underwent a recent renal evaluation.  For some reason it took him a while to do laboratory studies which she just did on June 17, 2024.  Of note however is that his kidney function has improved significantly.  Creatinine is down to 1.05 now with an estimated GFR of 81 cc/min.  Electrolytes were normal.  Hemoglobin 12.9.  Urinalysis likewise was unremarkable.  Sed rate and connective tissue studies were nonrevealing.  An SPEP and random urine for Bence-Nam protein though are still pending.    I therefore informed the patient that his renal function now seems normal.  He currently is off all antihypertensive medications and blood pressures are reasonable.  He has been doing a lot of reading on the Internet.  I am however concerned that he may be overdoing his dietary adjustments.  For example he is only eating 4 ounces of meat every other day.  He cannot take any dairy products because they exacerbates his irritable bowel syndrome.  I discussed that he should shoot for 60 g of protein per day.  I am also concerned about his caloric intake as he continues to lose weight.  Therefore I referred him to see a dietitian.  He also notes greasy  bowels from time to time.  He told me that you are advising that he do a screening colonoscopy and I encouraged him to follow through.  I took the liberty of referring him to see a dietitian so he can get appropriate advice.  Otherwise maintain adequate hydration.  Avoid nonsteroidals.  Will repeat a CBC and renal panel in 3 months to ensure stability of his renal function.  He will continue to monitor his blood pressures regularly    Thank you again for allowing me to participate in the care of your patient.  If you have any questions please feel free to call.        Sincerely,   Rio Mcfarland MD   [facility]  [address]  [facility] [address]    Document electronically generated by:  Rio Mcfarland MD

## 2024-06-21 NOTE — PATIENT INSTRUCTIONS
See the dietitian as we discussed.  I would encourage you to do a colonoscopy as recommended by Dr. Esqueda.  Repeat your kidney blood test in 3 months to make sure your kidney function remains stable.  Orders are in the computer.

## 2024-06-25 ENCOUNTER — OFFICE VISIT (OUTPATIENT)
Dept: INTERNAL MEDICINE CLINIC | Facility: CLINIC | Age: 64
End: 2024-06-25

## 2024-06-25 VITALS
OXYGEN SATURATION: 99 % | HEIGHT: 68 IN | SYSTOLIC BLOOD PRESSURE: 128 MMHG | BODY MASS INDEX: 25.22 KG/M2 | HEART RATE: 62 BPM | WEIGHT: 166.38 LBS | DIASTOLIC BLOOD PRESSURE: 65 MMHG | TEMPERATURE: 99 F

## 2024-06-25 DIAGNOSIS — I10 HYPERTENSION, ESSENTIAL: Primary | ICD-10-CM

## 2024-06-25 DIAGNOSIS — Z12.11 COLON CANCER SCREENING: ICD-10-CM

## 2024-06-25 DIAGNOSIS — K58.9 IRRITABLE BOWEL SYNDROME, UNSPECIFIED TYPE: ICD-10-CM

## 2024-06-25 PROCEDURE — 3074F SYST BP LT 130 MM HG: CPT | Performed by: INTERNAL MEDICINE

## 2024-06-25 PROCEDURE — 99213 OFFICE O/P EST LOW 20 MIN: CPT | Performed by: INTERNAL MEDICINE

## 2024-06-25 PROCEDURE — 3078F DIAST BP <80 MM HG: CPT | Performed by: INTERNAL MEDICINE

## 2024-06-25 PROCEDURE — 3008F BODY MASS INDEX DOCD: CPT | Performed by: INTERNAL MEDICINE

## 2024-06-25 RX ORDER — DICYCLOMINE HYDROCHLORIDE 10 MG/1
10 CAPSULE ORAL
Qty: 120 CAPSULE | Refills: 1 | Status: SHIPPED | OUTPATIENT
Start: 2024-06-25

## 2024-06-25 NOTE — PROGRESS NOTES
Subjective:   Patient ID: Aguila Rodríguez is a 63 year old male.    Hypertension  This is a chronic problem. The current episode started more than 1 month ago. The problem has been gradually improving since onset. The problem is controlled. There are no associated agents to hypertension. There are no known risk factors for coronary artery disease. Past treatments include lifestyle changes. The current treatment provides significant improvement. There are no compliance problems.  There is no history of angina, kidney disease, CAD/MI, CVA, heart failure or PVD. There is no history of chronic renal disease, a hypertension causing med or a thyroid problem.       History/Other:   Review of Systems   Constitutional: Negative.    Respiratory: Negative.     Cardiovascular: Negative.    Gastrointestinal:  Negative for anal bleeding and blood in stool.   Genitourinary:  Negative for dysuria, frequency and hematuria.     Current Outpatient Medications   Medication Sig Dispense Refill    DAILY MULTIPLE VITAMINS Oral Tab Take 1 tablet by mouth daily.      Solifenacin Succinate 5 MG Oral Tab Take 1 tablet (5 mg total) by mouth daily. (Patient not taking: Reported on 6/21/2024) 30 tablet 1    Dicyclomine HCl 10 MG Oral Cap Take 1 capsule (10 mg total) by mouth 4 (four) times daily before meals and nightly. (Patient not taking: Reported on 6/21/2024) 120 capsule 5     Allergies:  Allergies   Allergen Reactions    Famotidine SWELLING     Patient states med caused his throat to swell and cause gagging.     Omeprazole SWELLING     Patient states cause a lump in  His throat. ds    Flonase [Fluticasone] NAUSEA ONLY       Objective:   Physical Exam  Constitutional:       General: He is not in acute distress.     Appearance: He is well-developed. He is not ill-appearing, toxic-appearing or diaphoretic.   HENT:      Head: Normocephalic and atraumatic.      Right Ear: External ear normal.      Left Ear: External ear normal.      Nose: Nose  normal.      Mouth/Throat:      Pharynx: No oropharyngeal exudate.   Eyes:      General:         Right eye: No discharge.         Left eye: No discharge.      Conjunctiva/sclera: Conjunctivae normal.      Pupils: Pupils are equal, round, and reactive to light.   Neck:      Vascular: No JVD.   Cardiovascular:      Rate and Rhythm: Normal rate and regular rhythm.      Heart sounds: Normal heart sounds.   Pulmonary:      Effort: Pulmonary effort is normal. No respiratory distress.      Breath sounds: Normal breath sounds. No wheezing or rales.   Abdominal:      General: Bowel sounds are normal. There is no distension.      Palpations: Abdomen is soft. There is no mass.      Tenderness: There is no abdominal tenderness. There is no guarding or rebound.   Musculoskeletal:         General: No tenderness. Normal range of motion.      Cervical back: Normal range of motion and neck supple. No rigidity or tenderness.      Right lower leg: No edema.      Left lower leg: No edema.   Lymphadenopathy:      Cervical: No cervical adenopathy.   Skin:     General: Skin is warm and dry.      Findings: No rash.   Neurological:      Mental Status: He is alert and oriented to person, place, and time.         Assessment & Plan:   (I10) Hypertension, essential  (primary encounter diagnosis)  Plan: bp in fairly good range off any bp med and just lifestyle modification which he will continue to do. Rechecck 6mos.     (K58.9) Irritable bowel syndrome, unspecified type  Plan: d/w pt again to see our GI; has not had colonoscopy for more htan 10 yrs;  referral for GI already in chart. Pt given refill of his bentyl .    (Z12.11) Colon cancer screening  Plan: pt again told he is overdue for his screening colonoscpy.        No orders of the defined types were placed in this encounter.      Meds This Visit:  Requested Prescriptions      No prescriptions requested or ordered in this encounter       Imaging & Referrals:  None

## 2024-06-27 LAB
ALBUMIN SERPL ELPH-MCNC: 3.68 G/DL (ref 3.75–5.21)
ALBUMIN/GLOB SERPL: 1.26 {RATIO} (ref 1–2)
ALPHA1 GLOB SERPL ELPH-MCNC: 0.28 G/DL (ref 0.19–0.46)
ALPHA2 GLOB SERPL ELPH-MCNC: 0.66 G/DL (ref 0.48–1.05)
B-GLOBULIN SERPL ELPH-MCNC: 0.73 G/DL (ref 0.68–1.23)
GAMMA GLOB SERPL ELPH-MCNC: 1.25 G/DL (ref 0.62–1.7)
M PROTEIN 1 SERPL ELPH-MCNC: 0.27 G/DL (ref ?–0)
M PROTEIN 2 SERPL ELPH-MCNC: 0.28 G/DL (ref ?–0)
PROT SERPL-MCNC: 6.6 G/DL (ref 5.7–8.2)

## 2024-06-29 ENCOUNTER — TELEPHONE (OUTPATIENT)
Dept: NEPHROLOGY | Facility: CLINIC | Age: 64
End: 2024-06-29

## 2024-06-29 DIAGNOSIS — N18.32 STAGE 3B CHRONIC KIDNEY DISEASE (HCC): Primary | ICD-10-CM

## 2024-06-29 NOTE — TELEPHONE ENCOUNTER
There were 2 labs that were still pending when I saw him last.  They have now returned and they do show that he has an abnormal protein in both the blood and urine.  This needs to be further evaluated by a hematologist which is a blood doctor.  Referred to hematology for further workup and evaluation.

## 2024-07-01 NOTE — TELEPHONE ENCOUNTER
Informed patient of note below verbalized understanding provided #[phone] to schedule.Rn generated referral order.

## 2024-07-02 ENCOUNTER — TELEPHONE (OUTPATIENT)
Age: 64
End: 2024-07-02

## 2024-07-02 NOTE — TELEPHONE ENCOUNTER
NEW CONSULT   Marcos ROSADO  : 1960  Ph;[phone]  Dr. Bruna Pate  Dx: Stage 3b chronic kidney disease (HCC)

## 2024-07-05 ENCOUNTER — LAB ENCOUNTER (OUTPATIENT)
Dept: LAB | Facility: HOSPITAL | Age: 64
End: 2024-07-05
Attending: STUDENT IN AN ORGANIZED HEALTH CARE EDUCATION/TRAINING PROGRAM
Payer: COMMERCIAL

## 2024-07-05 ENCOUNTER — OFFICE VISIT (OUTPATIENT)
Dept: HEMATOLOGY/ONCOLOGY | Facility: HOSPITAL | Age: 64
End: 2024-07-05
Attending: STUDENT IN AN ORGANIZED HEALTH CARE EDUCATION/TRAINING PROGRAM
Payer: COMMERCIAL

## 2024-07-05 VITALS
OXYGEN SATURATION: 98 % | SYSTOLIC BLOOD PRESSURE: 150 MMHG | HEIGHT: 68 IN | WEIGHT: 164 LBS | DIASTOLIC BLOOD PRESSURE: 63 MMHG | TEMPERATURE: 98 F | RESPIRATION RATE: 16 BRPM | HEART RATE: 59 BPM | BODY MASS INDEX: 24.86 KG/M2

## 2024-07-05 DIAGNOSIS — D47.2 MONOCLONAL GAMMOPATHY OF UNKNOWN SIGNIFICANCE (MGUS): ICD-10-CM

## 2024-07-05 DIAGNOSIS — D47.2 MONOCLONAL GAMMOPATHY OF UNKNOWN SIGNIFICANCE (MGUS): Primary | ICD-10-CM

## 2024-07-05 PROCEDURE — 99211 OFF/OP EST MAY X REQ PHY/QHP: CPT

## 2024-07-05 PROCEDURE — 83521 IG LIGHT CHAINS FREE EACH: CPT

## 2024-07-05 PROCEDURE — 36415 COLL VENOUS BLD VENIPUNCTURE: CPT

## 2024-07-08 LAB
KAPPA LC FREE SER-MCNC: 2.4 MG/DL (ref 0.33–1.94)
KAPPA LC FREE/LAMBDA FREE SER NEPH: 2.21 {RATIO} (ref 0.26–1.65)
LAMBDA LC FREE SERPL-MCNC: 1.08 MG/DL (ref 0.57–2.63)

## 2024-07-08 NOTE — CONSULTS
[facility]  Initial Hematology Clinic Consult Note    Patient Name: Aguila Rodríguez   YOB: 1960   Medical Record Number: [id number]    Subjective:   Aguila Rodríguez is a 63 year old male with a history of GERD and IBS, CKD who follows with Dr. Mcfarland who was referred to hematology regarding IgM kappa monoclonal protein.  The patient saw nephrology for CKD of unclear etiology with creatinine of 1.55.  Serum protein electrophoresis was ordered for CKD workup which showed an IgM kappa M spike of 0.28, free light chain levels were not checked.  Patient was subsequently referred to hematology.    Clinically he feels well.  He has no significant concerns or complaints.    Review of Systems:  Hematology/Oncology ROS performed and negative except as above in HPI    History/Other:   Past Medical History:  Past Medical History:    Bone spur    C5/C6    Esophageal reflux    Interstitial cystitis    Irritable bowel syndrome (IBS)    Lipid screening    Osteoarthrosis, unspecified whether generalized or localized, unspecified site       Past Surgical History:  Past Surgical History:   Procedure Laterality Date    Colonoscopy & polypectomy  04/22/2008    colonoscopy w/ snare polypectomy    Knee surgery      arthroscopic surgery    Tonsillectomy         Current Medications:   dicyclomine 10 MG Oral Cap Take 1 capsule (10 mg total) by mouth 4 (four) times daily before meals and nightly. 120 capsule 1    DAILY MULTIPLE VITAMINS Oral Tab Take 1 tablet by mouth daily.         Allergies:   Allergies   Allergen Reactions    Famotidine SWELLING     Patient states med caused his throat to swell and cause gagging.     Omeprazole SWELLING     Patient states cause a lump in  His throat. ds    Flonase [Fluticasone] NAUSEA ONLY       Family Medical History:  Family History   Problem Relation Age of Onset    Hypertension Father     Cancer Father         bladder and prostate cancer    Hypertension Mother      Ear Problems Mother     Stroke Paternal Grandfather        Social History:  Social History     Socioeconomic History    Marital status: Single     Spouse name: Not on file    Number of children: Not on file    Years of education: Not on file    Highest education level: Not on file   Occupational History    Not on file   Tobacco Use    Smoking status: Never     Passive exposure: Never    Smokeless tobacco: Never   Vaping Use    Vaping status: Never Used   Substance and Sexual Activity    Alcohol use: Not Currently     Comment: rarely    Drug use: No    Sexual activity: Not on file   Other Topics Concern     Service Not Asked    Blood Transfusions Not Asked    Caffeine Concern No    Occupational Exposure Not Asked    Hobby Hazards Not Asked    Sleep Concern Not Asked    Stress Concern Not Asked    Weight Concern Not Asked    Special Diet Not Asked    Back Care Not Asked    Exercise Not Asked    Bike Helmet Not Asked    Seat Belt Not Asked    Self-Exams Not Asked   Social History Narrative    Not on file     Social Determinants of Health     Financial Resource Strain: Not on file   Food Insecurity: Not on file   Transportation Needs: Not on file   Physical Activity: Not on file   Stress: Not on file   Social Connections: Not on file   Housing Stability: At Risk (8/18/2023)    Received from [facility]     Living Situation: Not on file     Housing Problems: Not on file       Objective:   Blood pressure 150/63, pulse 59, temperature 98.1 °F (36.7 °C), temperature source Oral, resp. rate 16, height 1.727 m (5' 8\"), weight 74.4 kg (164 lb), SpO2 98%.  Physical Exam:  ECOG PS: 0  General: A&Ox3, NAD  HEENT: PERRL, OP clear  CV: RRR, no murmurs, + pulses  Pulm: CTA b/l, no w/r/r, normal effort  Abd: soft, ntnd, normal bowel sounds, no HSM or masses  Lymph: no palpable lymphadenopathy   Extremities: no edema  Neurological: grossly intact    Results:   Labs:  Lab Results   Component Value Date    WBC  6.2 06/17/2024    HGB 12.9 (L) 06/17/2024    HCT 38.5 (L) 06/17/2024    .0 06/17/2024    CREATSERUM 1.05 06/17/2024    BUN 16 06/17/2024     06/17/2024    K 4.0 06/17/2024     06/17/2024    CO2 26.0 06/17/2024    GLU 90 06/17/2024    CA 8.9 06/17/2024    ALB 3.5 06/17/2024    ALB 3.68 (L) 06/17/2024    ALKPHO 64 02/23/2024    BILT 1.1 02/23/2024    TP 6.6 06/17/2024    AST 9 (L) 02/23/2024    ALT 16 02/23/2024    ESRML 16 06/17/2024    CRP <0.29 06/17/2024    PHOS 3.3 06/17/2024    TROP 0.00 02/18/2017     Assessment & Plan:   Aguila Rodríguez is a 63 year old male with a history of GERD and IBS, CKD who follows with Dr. Mcfarland who was referred to hematology regarding IgM kappa monoclonal protein.      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  Hemoglobin 12.9, no bone pain, renal function has returned to normal with a creatinine of 1.05 in mid June.  Calcium level normal.  We discussed the natural history of plasma cell dyscrasias and discussed diagnosis of MGUS.  We will check free light chain levels and also 24-hour Bence-Nam protein.  Tentative plans for follow-up in 6 months for reassessment, if normal at that time we can likely follow him yearly.    MDM Moderate: undiagnosed new problem with uncertain prognosis; review of notes and tests , ordering of tests, independent interpretation of tests; low risk of morbidity from additional testing/treatment    Jak Couch DO    [facility]  [facility]    This note was created using a voice-recognition transcribing system. Incorrect words or phrases may have been missed during proofreading. Please interpret accordingly.

## 2024-07-22 PROCEDURE — 84156 ASSAY OF PROTEIN URINE: CPT

## 2024-07-22 PROCEDURE — 86335 IMMUNFIX E-PHORSIS/URINE/CSF: CPT

## 2024-07-22 PROCEDURE — 84166 PROTEIN E-PHORESIS/URINE/CSF: CPT

## 2024-07-23 ENCOUNTER — LAB ENCOUNTER (OUTPATIENT)
Dept: LAB | Facility: HOSPITAL | Age: 64
End: 2024-07-23
Attending: STUDENT IN AN ORGANIZED HEALTH CARE EDUCATION/TRAINING PROGRAM
Payer: COMMERCIAL

## 2024-07-23 DIAGNOSIS — D47.2 MONOCLONAL GAMMOPATHY OF UNKNOWN SIGNIFICANCE (MGUS): ICD-10-CM

## 2024-07-23 LAB
M PROTEIN 24H UR ELPH-MRATE: 150.7 MG/24 HR (ref ?–149.1)
SPECIMEN VOL UR: 1150 ML

## 2024-09-11 NOTE — H&P
[facility] - Gastroenterology                                                                                                  Clinic History and Physical       Referring provider: Marcos    Chief Complaint   Patient presents with    Colonoscopy Screening     Mucus in stool ,last Colon was 2008     HPI:   Aguila Rodríguez is a 63 year old man with history of BMI 25, several listed musculoskeletal problems, tonsillectomy, chronic kidney disease here regarding colonoscopy    Says he was told by his doctor sometime ago to have a colonoscopy as he has not had one in over 15 years.  He does note a history of irritable bowel syndrome for 30 years diagnosed by his doctor years ago.  Takes dicyclomine sometimes for abdominal discomfort which helps.  He says he was diagnosed as his stools tended to be loose.  Notes issues however over the last year with altered bowel movements which initially were floating at times.  Notes also in the last 5 weeks describing mucus in the stool quite heavy sometimes with red specks which she has not had before.  Usually about two times a day during this time. Denies abdominal pain. Denies any travel outside the country in the last year.  Has used Metamucil in the past for his irritable bowel but not necessarily doing anything for this at this point.  Denies any unintentional weight loss, nausea vomiting, family history of colorectal cancer.    History, Medications, Allergies, ROS:      Past Medical History:    Bone spur    C5/C6    Esophageal reflux    Interstitial cystitis    Irritable bowel syndrome (IBS)    Lipid screening    Osteoarthrosis, unspecified whether generalized or localized, unspecified site      Past Surgical History:   Procedure Laterality Date    Colonoscopy & polypectomy  04/22/2008    colonoscopy w/ snare polypectomy    Knee surgery      arthroscopic surgery    Tonsillectomy         Family Hx:   Family History   Problem Relation Age of Onset    Hypertension Father     Cancer Father         bladder and prostate cancer    Hypertension Mother     Ear Problems Mother     Stroke Paternal Grandfather       Social History:   Social History     Socioeconomic History    Marital status: Single   Tobacco Use    Smoking status: Never     Passive exposure: Never    Smokeless tobacco: Never   Vaping Use    Vaping status: Never Used   Substance and Sexual Activity    Alcohol use: Not Currently     Comment: rarely    Drug use: No   Other Topics Concern    Caffeine Concern No     Social Determinants of Health      Received from [facility]        Medications (Active prior to today's visit):  Current Outpatient Medications   Medication Sig Dispense Refill    dicyclomine 10 MG Oral Cap Take 1 capsule (10 mg total) by mouth 4 (four) times daily before meals and nightly. 120 capsule 1    Solifenacin Succinate 5 MG Oral Tab Take 1 tablet (5 mg total) by mouth daily. (Patient not taking: Reported on 6/21/2024) 30 tablet 1    DAILY MULTIPLE VITAMINS Oral Tab Take 1 tablet by mouth daily.         Allergies:  Allergies   Allergen Reactions    Famotidine SWELLING     Patient states med caused his throat to swell and cause gagging.     Omeprazole SWELLING     Patient states cause a lump in  His throat. ds    Flonase [Fluticasone] NAUSEA ONLY     ROS:   Systems were reviewed and were negative except as noted in the HPI    PHYSICAL EXAM:   Blood pressure 150/73, pulse 68, height 5' 8\" (1.727 m), weight 172 lb (78 kg).    General:awake, cooperative, no acute distress  HEENT: EOMI, no scleral icterus, MMM; oral pharnyx is without exudates or lesions  Neck: no lymphadenopathy; thyroid is not enlarged and without nodules  CV: RRR  Resp: non-labored breathing  Abd: soft, non-tender, non-distended  Ext: no lower extremity swelling  Neuro: Alert, Oriented X 3  Skin: no rashes, bruises  Psych: normal  affect    Labs/Imaging:     Reviewed as noted in the HPI and A/P    ASSESSMENT/PLAN:   Aguila Rodríguez is a 63 year old man with history of BMI 25, several listed musculoskeletal problems, tonsillectomy, chronic kidney disease here regarding colonoscopy    Review of the chart notes prior GI care with [facility] gastroenterologist, Dr. Richardson. There is an EGD from March 2017 for GERD with findings of normal EGD. I reviewed his primary care physician's note from 6/25/24 with noted IBS.    Discussed the uncertain specific etiology for the mucus which is nonspecific in nature.  Discussed recommendations for stool testing for infectious etiologies.  We also discussed recommendations for updating colonoscopy for exclusion of inflammatory conditions, growths and tumors.  We also discussed that there are groups of patients with irritable bowel syndrome that have mucus without a specific etiology    Recommend:  -colonoscopy with MAC or endoscopist directed sedation with split suprep at [facility] on a Friday  -stool testing    Colonoscopy consent: I have discussed the risks, benefits, and alternatives (including stool testing) to colonoscopy with the patient [who demonstrated understanding], including but not limited to the risks of bleeding, infection, pain, as well as the risks of anesthesia and perforation all leading to prolonged hospitalization, surgical intervention, or could be life threatening. I also specifically mentioned the risk of missed lesions/polyps. All questions were answered to the patient’s satisfaction. The patient signed informed consent and elected to proceed with colonoscopy with intervention [i.e. polypectomy, biopsy, control of bleeding, etc.] as indicated. I also discussed a ride home from a family member of friend is required and driving after the procedure with sedation is not safe or recommended.    Orders This Visit:  No orders of the defined types were placed in this  encounter.    Meds This Visit:  Requested Prescriptions      No prescriptions requested or ordered in this encounter     Imaging & Referrals:  None     Ariel Greenberg MD  [facility] - Gastroenterology  9/12/2024

## 2024-09-12 ENCOUNTER — OFFICE VISIT (OUTPATIENT)
Dept: GASTROENTEROLOGY | Facility: CLINIC | Age: 64
End: 2024-09-12

## 2024-09-12 ENCOUNTER — TELEPHONE (OUTPATIENT)
Dept: GASTROENTEROLOGY | Facility: CLINIC | Age: 64
End: 2024-09-12

## 2024-09-12 VITALS
WEIGHT: 172 LBS | DIASTOLIC BLOOD PRESSURE: 73 MMHG | HEART RATE: 68 BPM | BODY MASS INDEX: 26.07 KG/M2 | HEIGHT: 68 IN | SYSTOLIC BLOOD PRESSURE: 150 MMHG

## 2024-09-12 DIAGNOSIS — Z12.11 SCREENING FOR COLORECTAL CANCER: Primary | ICD-10-CM

## 2024-09-12 DIAGNOSIS — R19.4 ALTERED BOWEL HABITS: ICD-10-CM

## 2024-09-12 DIAGNOSIS — Z12.12 SCREENING FOR COLORECTAL CANCER: ICD-10-CM

## 2024-09-12 DIAGNOSIS — Z12.12 SCREENING FOR COLORECTAL CANCER: Primary | ICD-10-CM

## 2024-09-12 DIAGNOSIS — R19.5 MUCUS IN STOOL: ICD-10-CM

## 2024-09-12 DIAGNOSIS — Z12.11 SCREENING FOR COLORECTAL CANCER: ICD-10-CM

## 2024-09-12 DIAGNOSIS — R19.5 MUCUS IN STOOL: Primary | ICD-10-CM

## 2024-09-12 PROCEDURE — 99204 OFFICE O/P NEW MOD 45 MIN: CPT | Performed by: INTERNAL MEDICINE

## 2024-09-12 PROCEDURE — 3077F SYST BP >= 140 MM HG: CPT | Performed by: INTERNAL MEDICINE

## 2024-09-12 PROCEDURE — 3078F DIAST BP <80 MM HG: CPT | Performed by: INTERNAL MEDICINE

## 2024-09-12 PROCEDURE — 3008F BODY MASS INDEX DOCD: CPT | Performed by: INTERNAL MEDICINE

## 2024-09-12 RX ORDER — SODIUM, POTASSIUM,MAG SULFATES 17.5-3.13G
SOLUTION, RECONSTITUTED, ORAL ORAL
Qty: 1 EACH | Refills: 0 | Status: SHIPPED | OUTPATIENT
Start: 2024-09-12

## 2024-09-12 NOTE — PATIENT INSTRUCTIONS
1. Schedule colonoscopy with MAC at [facility] on a Friday     2.  bowel prep from pharmacy (split suprep) - please read attached/given instructions very carefully     3. Medication     Continue all medications for procedure    4. Read all bowel prep instructions carefully    5. AVOID seeds, nuts, popcorn, raw fruits and vegetables (cooked is okay) for 2-3 days before procedure    >>>Please note: if you were prescribed a bowel prep and it is too expensive or not covered by insurance, it is okay to substitute Trilyte or Golytely (or any similar generic prep). This can be done by notifying the pharmacy or calling our office.     6. Complete your stool tests

## 2024-09-12 NOTE — TELEPHONE ENCOUNTER
Scheduled for:  Colonoscopy 45368  Provider Name:  Dr. Greenberg  Date:  09/18/24  Location:[facility]  Sedation:  MAC  Time: 0745 am ,(pt is aware that [facility] will call the day before to confirm arrival time)   Prep:  Split dose Suprep  Meds/Allergies Reconciled?:  Physician Reviewed   Diagnosis with codes: Colorectal cancer screening Z12.11,Z12.12,Mucus in stool R19.5, Altered bowel habits R19.4   Was patient informed to call insurance with codes (Y/N):  Yes  Referral sent?:  Referral was sent at the time of electronic surgical scheduling.  [facility] or [facility] notified?:  I sent an electronic request to Endo Scheduling and received a confirmation today.  Medication Orders:  Pt is aware to NOT take iron pills, herbal meds and diet supplements for 7 days before exam. Also to NOT take any form of alcohol, recreational drugs and any forms of ED meds 24 hours before exam.   Misc Orders:  Patient was informed about the new cancellation policy for his/her procedure. Patient was also given a copy of the cancellation policy at the time of the appointment and verbalized understanding.      Further instructions given by staff:  I provide prep instructions to patient at the time of the appointment and reviewed date and location, patient verbalized that he understood and is aware to call if he has any questions.

## 2024-09-13 ENCOUNTER — TELEPHONE (OUTPATIENT)
Facility: CLINIC | Age: 64
End: 2024-09-13

## 2024-09-13 ENCOUNTER — LAB ENCOUNTER (OUTPATIENT)
Dept: LAB | Age: 64
End: 2024-09-13
Attending: INTERNAL MEDICINE
Payer: COMMERCIAL

## 2024-09-13 DIAGNOSIS — N17.9 AKI (ACUTE KIDNEY INJURY) (HCC): ICD-10-CM

## 2024-09-13 LAB
ALBUMIN SERPL-MCNC: 4.2 G/DL (ref 3.2–4.8)
ANION GAP SERPL CALC-SCNC: 6 MMOL/L (ref 0–18)
BASOPHILS # BLD AUTO: 0.03 X10(3) UL (ref 0–0.2)
BASOPHILS NFR BLD AUTO: 0.5 %
BUN BLD-MCNC: 20 MG/DL (ref 9–23)
CALCIUM BLD-MCNC: 9.9 MG/DL (ref 8.7–10.4)
CHLORIDE SERPL-SCNC: 107 MMOL/L (ref 98–112)
CO2 SERPL-SCNC: 28 MMOL/L (ref 21–32)
CREAT BLD-MCNC: 1.12 MG/DL
EGFRCR SERPLBLD CKD-EPI 2021: 75 ML/MIN/1.73M2 (ref 60–?)
EOSINOPHIL # BLD AUTO: 0.08 X10(3) UL (ref 0–0.7)
EOSINOPHIL NFR BLD AUTO: 1.2 %
ERYTHROCYTE [DISTWIDTH] IN BLOOD BY AUTOMATED COUNT: 13.2 %
GLUCOSE BLD-MCNC: 90 MG/DL (ref 70–99)
HCT VFR BLD AUTO: 38.5 %
HGB BLD-MCNC: 13.1 G/DL
IMM GRANULOCYTES # BLD AUTO: 0.01 X10(3) UL (ref 0–1)
IMM GRANULOCYTES NFR BLD: 0.2 %
LYMPHOCYTES # BLD AUTO: 1.93 X10(3) UL (ref 1–4)
LYMPHOCYTES NFR BLD AUTO: 29.1 %
MCH RBC QN AUTO: 31 PG (ref 26–34)
MCHC RBC AUTO-ENTMCNC: 34 G/DL (ref 31–37)
MCV RBC AUTO: 91.2 FL
MONOCYTES # BLD AUTO: 0.53 X10(3) UL (ref 0.1–1)
MONOCYTES NFR BLD AUTO: 8 %
NEUTROPHILS # BLD AUTO: 4.05 X10 (3) UL (ref 1.5–7.7)
NEUTROPHILS # BLD AUTO: 4.05 X10(3) UL (ref 1.5–7.7)
NEUTROPHILS NFR BLD AUTO: 61 %
OSMOLALITY SERPL CALC.SUM OF ELEC: 294 MOSM/KG (ref 275–295)
PHOSPHATE SERPL-MCNC: 3.9 MG/DL (ref 2.4–5.1)
PLATELET # BLD AUTO: 222 10(3)UL (ref 150–450)
POTASSIUM SERPL-SCNC: 4.5 MMOL/L (ref 3.5–5.1)
RBC # BLD AUTO: 4.22 X10(6)UL
SODIUM SERPL-SCNC: 141 MMOL/L (ref 136–145)
WBC # BLD AUTO: 6.6 X10(3) UL (ref 4–11)

## 2024-09-13 PROCEDURE — 80069 RENAL FUNCTION PANEL: CPT

## 2024-09-13 PROCEDURE — 85025 COMPLETE CBC W/AUTO DIFF WBC: CPT

## 2024-09-13 PROCEDURE — 36415 COLL VENOUS BLD VENIPUNCTURE: CPT

## 2024-09-13 NOTE — TELEPHONE ENCOUNTER
It would be ideal    MD Alejandro [facility] - Gastroenterology  9/13/2024  2:44 PM

## 2024-09-13 NOTE — TELEPHONE ENCOUNTER
Dr. Greenberg,    Please see message below. Does patient need to complete stool tests prior to procedure on 9/18?   no

## 2024-09-13 NOTE — TELEPHONE ENCOUNTER
Patient states that he picked up stool containers today.  He wants to know if these test are mandatory before colonoscopy.  He states it is too much stress to complete stool test, colonoscopy and a follow up appointment.   Patient is requesting to speak with an RN.  He wants to know if the Colonoscopy will be canceled if stool test are not done.  Please call

## 2024-09-13 NOTE — TELEPHONE ENCOUNTER
Spoke to patient and let him know it would be ideal to have stool tests completed prior to procedure. However, patient states he will not be able to prior to the procedure.    HOLLY- Dr. Greenberg

## 2024-09-18 PROBLEM — Z12.12 ENCOUNTER FOR COLORECTAL CANCER SCREENING: Status: ACTIVE | Noted: 2024-09-18

## 2024-09-18 PROBLEM — Z12.11 ENCOUNTER FOR COLORECTAL CANCER SCREENING: Status: ACTIVE | Noted: 2024-09-18

## 2024-09-18 PROCEDURE — 88305 TISSUE EXAM BY PATHOLOGIST: CPT | Performed by: INTERNAL MEDICINE

## 2024-09-20 ENCOUNTER — OFFICE VISIT (OUTPATIENT)
Dept: NEPHROLOGY | Facility: CLINIC | Age: 64
End: 2024-09-20

## 2024-09-20 VITALS
HEIGHT: 68 IN | DIASTOLIC BLOOD PRESSURE: 67 MMHG | HEART RATE: 67 BPM | SYSTOLIC BLOOD PRESSURE: 126 MMHG | BODY MASS INDEX: 24.86 KG/M2 | WEIGHT: 164 LBS

## 2024-09-20 DIAGNOSIS — N17.9 AKI (ACUTE KIDNEY INJURY) (HCC): Primary | ICD-10-CM

## 2024-09-20 PROCEDURE — 3078F DIAST BP <80 MM HG: CPT | Performed by: INTERNAL MEDICINE

## 2024-09-20 PROCEDURE — 99214 OFFICE O/P EST MOD 30 MIN: CPT | Performed by: INTERNAL MEDICINE

## 2024-09-20 PROCEDURE — 3074F SYST BP LT 130 MM HG: CPT | Performed by: INTERNAL MEDICINE

## 2024-09-20 PROCEDURE — 3008F BODY MASS INDEX DOCD: CPT | Performed by: INTERNAL MEDICINE

## 2024-09-20 NOTE — PROGRESS NOTES
09/20/24        Patient: Aguila Rodríguez   YOB: 1960   Date of Visit: 9/20/2024       Dear  Dr. Yin MD,      Thank you for referring Aguila Rodríguez to my practice.  Please find my assessment and plan below.      As you know he is a 63-year-old male with a history of borderline hypertension, irritable bowel syndrome, history of interstitial cystitis who I now had the pleasure of seeing for follow-up of what now appears to be acute renal failure.  Overall patient states has been doing well without any chest pain, shortness of breath, GI or urinary tract symptoms.  Continues to watch his diet carefully.  He has been hoping to avoid any antihypertensive medications.  He did see hematology in July and was told that he had MGUS.  He just had a colonoscopy done earlier this month and had an adenomatous polyp removed.  Still has some symptoms consistent with irritable bowel syndrome.  GI just put him on a fiber supplement.    On physical exam his blood pressure is 126/67 with a pulse of 67 he weighed 164 pounds.  His neck was supple without JVD.  Lungs are clear.  Heart revealed a regular rate and rhythm without gallops or murmurs.  Abdomen soft, flat, nontender without organomegaly, masses or bruits.  Extremities revealed no edema.    I reviewed his most recent labs which were done on September 13, 2024.  Creatinine remains good at 1.12 with an estimated GFR of 75 cc/min.  Hemoglobin 13.1.  Electrolytes are good.    I therefore reassured the patient there is acute renal failure now seems to have resolved.  His blood pressure seems to be okay today off medications.  He knows to maintain adequate hydration.  Avoid nonsteroidals.  He will repeat his renal panel in January 2025 when he does follow-up laboratory studies for hematology.  If his renal function remains normal may not need to see him for follow-up unless you felt it was clinically indicated.    Thank you again for allowing me to participate  in the care of your patient.  If you have any questions please feel free to call.           Sincerely,   Rio Mcfarland MD   [facility], [facility], [place]  [address] [address]    Document electronically generated by:  Rio Mcfarland MD

## 2024-09-20 NOTE — PATIENT INSTRUCTIONS
Repeat your kidney blood test in January 2025 when you do follow-up laboratory studies for hematology.  Orders are in the computer.

## 2024-09-25 ENCOUNTER — TELEPHONE (OUTPATIENT)
Facility: CLINIC | Age: 64
End: 2024-09-25

## 2024-09-25 NOTE — TELEPHONE ENCOUNTER
Left message to call back. Mychart sent.    Health maintenance updated. Last colonoscopy done 9/18/24. 3 year recall placed into Pt Outreach, next due on 09/2027 per Dr. Greenberg.

## 2024-09-25 NOTE — TELEPHONE ENCOUNTER
----- Message from Ariel Greenberg sent at 9/24/2024  5:13 AM CDT -----  GI staff:    Please call the patient. Let him know that I'm currently out of the office till next week but wanted to let him know I reviewed the pathology report from the polyp completely removed during your recent colonoscopy. The polyp removed was an adenoma which is a pre-cancerous type growth which was completely removed.     The current health care guidelines recommend that patients with the size and number of your adenoma type polyp(s) should repeat their colonoscopy in 3 years (please place recall) for you that is in September 2027, to look for any new polyps that might have grown.      Thanks    MD Alejnadro [facility]  [facility] - Gastroenterology  9/24/2024  5:12 AM

## 2024-09-25 NOTE — TELEPHONE ENCOUNTER
Dr. Greenberg,    I spoke to patient and reviewed your note. He had a few follow up questions.     Was there any inflammation seen, what was causing the mucus?  Were the hemorrhoids internal or external, is there anything to do other than fiber diet?  The clips that were placed are supposed to fall off on their own, is there pain how? How long can it take?

## 2024-10-01 NOTE — TELEPHONE ENCOUNTER
There was no inflammation seen. As I have told him, I'm not sure what the mucous was from. Possible the polyp was doing it and the way to know if it has resolved then it may have been the polyp as it was removed. If it is still present, recommend doing the stool tests I ordered for him to check for infection.  The hemorrhoids are inside. These are common. There is nothing that should be done regarding them  The clips should fall off on their own. The timing is variable. The should not cause pain.     Thanks    MD Alejandro [facility]  [facility] - Gastroenterology  10/1/2024  3:30 PM

## 2024-10-02 NOTE — TELEPHONE ENCOUNTER
Spoke to patient and reviewed note below. Patient verbalized understanding. No further questions at this time.

## 2024-10-18 ENCOUNTER — TELEPHONE (OUTPATIENT)
Facility: CLINIC | Age: 64
End: 2024-10-18

## 2024-10-18 NOTE — TELEPHONE ENCOUNTER
1st,overdue reminder letter mailed out to patient   Labs  order  Orders Placed 9/12/24    C. diff toxigenic PCR (OPT)  Giardia + Crypto Antigen, Stool  Stool Culture W/Shigatoxin

## 2024-10-22 ENCOUNTER — TELEPHONE (OUTPATIENT)
Facility: CLINIC | Age: 64
End: 2024-10-22

## 2024-10-22 NOTE — TELEPHONE ENCOUNTER
Patient called to inform Dr. Greenberg's office that he will  hold off on completing the labs he received a letter for. HOLLY

## 2024-11-01 ENCOUNTER — OFFICE VISIT (OUTPATIENT)
Dept: OTOLARYNGOLOGY | Facility: CLINIC | Age: 64
End: 2024-11-01

## 2024-11-01 DIAGNOSIS — R13.10 DYSPHAGIA, UNSPECIFIED TYPE: Primary | ICD-10-CM

## 2024-11-01 RX ORDER — AZELASTINE 1 MG/ML
2 SPRAY, METERED NASAL 2 TIMES DAILY
Qty: 30 ML | Refills: 0 | Status: SHIPPED | OUTPATIENT
Start: 2024-11-01

## 2024-11-01 NOTE — PROGRESS NOTES
Aguila Rodríguez is a 63 year old male.    Chief Complaint   Patient presents with    Throat Problem     Patient is here due to an excess of phlegm when swallowing. Reports slight issues swallow inf        HISTORY OF PRESENT ILLNESS  He presents with a several year history of problems with swallowing.  States that this all started around 2017 and remembers having an esophagram and probably a video swallow performed and states that there was something wrong with some cartilage that caused him to have swallowing difficulties.  He was diagnosed with reflux at that time and has trialed PPIs in the past with bad consequences.  More recently he developed an ear infection about a year ago and was on an antibiotic and due to the effects of the antibiotic on his GI tract ended up losing several pounds of weight.  Lost about 15 to 20 pounds at that time.  More recently he has purposefully tried to lose weight due to some other issues and has lost about 20 pounds.  Now presents with complaints about chronic phlegm sensation in the back of his throat.  Denies ever getting to the point where he feels like he needs a Heimlich maneuver.  Does have a history like there is a something stuck in the back of his throat.  No other signs, symptoms or complaints.      Social History     Socioeconomic History    Marital status: Single   Tobacco Use    Smoking status: Never     Passive exposure: Never    Smokeless tobacco: Never   Vaping Use    Vaping status: Never Used   Substance and Sexual Activity    Alcohol use: Not Currently     Comment: rarely    Drug use: No   Other Topics Concern    Caffeine Concern No       Family History   Problem Relation Age of Onset    Hypertension Father     Cancer Father         bladder and prostate cancer    Hypertension Mother     Ear Problems Mother     Stroke Paternal Grandfather        Past Medical History:    Bone spur    C5/C6    Esophageal reflux    Interstitial cystitis    Irritable bowel syndrome  (IBS)    Lipid screening    Osteoarthrosis, unspecified whether generalized or localized, unspecified site    Rectal adenoma    tubulovillous       Past Surgical History:   Procedure Laterality Date    Colonoscopy N/A 9/18/2024    Procedure: COLONOSCOPY;  Surgeon: Ariel Greenberg MD;  Location: [facility] MAIN OR    Colonoscopy & polypectomy  04/22/2008    colonoscopy w/ snare polypectomy    Knee surgery      arthroscopic surgery    Tonsillectomy           REVIEW OF SYSTEMS    System Neg/Pos Details   Constitutional Negative Fatigue, fever and weight loss.   ENMT Negative Drooling.   Eyes Negative Blurred vision and vision changes.   Respiratory Negative Dyspnea and wheezing.   Cardio Negative Chest pain, irregular heartbeat/palpitations and syncope.   GI Negative Abdominal pain and diarrhea.   Endocrine Negative Cold intolerance and heat intolerance.   Neuro Negative Tremors.   Psych Negative Anxiety and depression.   Integumentary Negative Frequent skin infections, pigment change and rash.   Hema/Lymph Negative Easy bleeding and easy bruising.           PHYSICAL EXAM    There were no vitals taken for this visit.       Constitutional Normal Overall appearance - Normal.   Psychiatric Normal Orientation - Oriented to time, place, person & situation. Appropriate mood and affect.   Neck Exam Normal Inspection - Normal. Palpation - Normal. Parotid gland - Normal. Thyroid gland - Normal.   Eyes Normal Conjunctiva - Right: Normal, Left: Normal. Pupil - Right: Normal, Left: Normal. Fundus - Right: Normal, Left: Normal.   Neurological Normal Memory - Normal. Cranial nerves - Cranial nerves II through XII grossly intact.   Head/Face Normal Facial features - Normal. Eyebrows - Normal. Skull - Normal.        Nasopharynx Normal External nose - Normal. Lips/teeth/gums - Normal. Tonsils - Normal. Oropharynx - Normal.   Ears Normal Inspection - Right: Normal, Left: Normal. Canal - Right: Normal, Left: Normal. TM - Right: Normal,  Left: Normal.   Skin Normal Inspection - Normal.        Lymph Detail Normal Submental. Submandibular. Anterior cervical. Posterior cervical. Supraclavicular.        Nose/Mouth/Throat Normal External nose - Normal. Lips/teeth/gums - Normal. Tonsils - Normal. Oropharynx - Normal.   Nose/Mouth/Throat Normal Nares - Right: Normal Left: Normal. Septum -Normal  Turbinates - Right: Normal, Left: Normal.nasal congestion.   Procedures:  Endoscopy/Laryngoscopy  Pre-Procedure Care: Verbal consent was obtained. Procedure/risks were explained. Questions were answered. Correct patient identified. Correct side and site confirmed.      A topical spray of ).25% Neosynephrine was sprayed into the nose.    Laryngoscopy:  Flexible Fiberoptic Laryngoscopy: A diagnostic flexible fiberoptic laryngoscopy was performed. The flexible fiberoptic laryngoscope was placed into the nose or mouthand advanced  into the interior of the larynx. A thorough examination of the interior of the larynx was performed.   Findings were as follows.       Hypopharynx/Larynx:  Epiglottis is normal.  Arytenoids:  Bilateral: Arytenoids are normal.  Vocal folds-false  Bilateral: Vocal folds (false) are normal.  Vocal folds-true  Bilateral: Vocal folds (true) are normal.  Pyriform sinus:  Bilateral: Pyriform sinuses are normal.  Base of tongue is normal in appearance.  There is no airway obstruction.   General comments: No lesions masses polyps or perhaps some small superior nodules noted.  No other abnormalities of the antritis esophagus.  No significant erythema.              Current Outpatient Medications:     azelastine 0.1 % Nasal Solution, 2 sprays by Nasal route 2 (two) times daily., Disp: 30 mL, Rfl: 0    Na Sulfate-K Sulfate-Mg Sulf (SUPREP BOWEL PREP KIT) 17.5-3.13-1.6 GM/177ML Oral Solution, As directed - see instructions, Disp: 1 each, Rfl: 0    dicyclomine 10 MG Oral Cap, Take 1 capsule (10 mg total) by mouth 4 (four) times daily before meals and  nightly., Disp: 120 capsule, Rfl: 1    Solifenacin Succinate 5 MG Oral Tab, Take 1 tablet (5 mg total) by mouth daily., Disp: 30 tablet, Rfl: 1    DAILY MULTIPLE VITAMINS Oral Tab, Take 1 tablet by mouth daily., Disp: , Rfl:   ASSESSMENT AND PLAN    1. Dysphagia, unspecified type  Having significant issues with dysphagia.  Does not really tolerate many medications so I have elected to simply give him Astelin nasal spray for some postnasal discharge and hold off on any other allergy meds.  Does not tolerate any PPIs either.  I have recommended a video swallow as well as esophagram to rule out any reflux.  Return to clinic after studies to discuss further management.  - XR VIDEO SWALLOW (CPT=74230); Future  - XR UGI/ESOPHAGUS DOUBLE CONTRAST (CPT=74246); Future        This note was prepared using Dragon Medical voice recognition dictation software. As a result errors may occur. When identified these errors have been corrected. While every attempt is made to correct errors during dictation discrepancies may still exist    Moris Lugo MD    11/1/2024    11:44 AM

## 2024-12-09 ENCOUNTER — LAB ENCOUNTER (OUTPATIENT)
Dept: LAB | Age: 64
End: 2024-12-09
Attending: INTERNAL MEDICINE
Payer: COMMERCIAL

## 2024-12-09 DIAGNOSIS — R19.5 MUCUS IN STOOL: ICD-10-CM

## 2024-12-09 DIAGNOSIS — R19.4 ALTERED BOWEL HABITS: ICD-10-CM

## 2024-12-09 LAB
CRYPTOSP AG STL QL IA: NEGATIVE
G LAMBLIA AG STL QL IA: NEGATIVE

## 2024-12-09 PROCEDURE — 87015 SPECIMEN INFECT AGNT CONCNTJ: CPT

## 2024-12-09 PROCEDURE — 87493 C DIFF AMPLIFIED PROBE: CPT

## 2024-12-09 PROCEDURE — 87329 GIARDIA AG IA: CPT

## 2024-12-09 PROCEDURE — 87272 CRYPTOSPORIDIUM AG IF: CPT

## 2024-12-09 PROCEDURE — 87045 FECES CULTURE AEROBIC BACT: CPT

## 2024-12-09 PROCEDURE — 87427 SHIGA-LIKE TOXIN AG IA: CPT

## 2024-12-09 PROCEDURE — 87046 STOOL CULTR AEROBIC BACT EA: CPT

## 2024-12-09 PROCEDURE — 87077 CULTURE AEROBIC IDENTIFY: CPT

## 2024-12-10 ENCOUNTER — HOSPITAL ENCOUNTER (OUTPATIENT)
Dept: GENERAL RADIOLOGY | Facility: HOSPITAL | Age: 64
Discharge: HOME | End: 2024-12-10
Attending: OTOLARYNGOLOGY
Payer: COMMERCIAL

## 2024-12-10 DIAGNOSIS — R13.10 DYSPHAGIA, UNSPECIFIED TYPE: ICD-10-CM

## 2024-12-10 LAB — C DIFF TOX B STL QL: NEGATIVE

## 2024-12-10 PROCEDURE — 74246 X-RAY XM UPR GI TRC 2CNTRST: CPT | Performed by: OTOLARYNGOLOGY

## 2024-12-10 PROCEDURE — 74230 X-RAY XM SWLNG FUNCJ C+: CPT | Performed by: OTOLARYNGOLOGY

## 2024-12-10 PROCEDURE — 92611 MOTION FLUOROSCOPY/SWALLOW: CPT

## 2024-12-10 NOTE — PROGRESS NOTES
ADULT VIDEOFLUOROSCOPIC SWALLOWING STUDY       ADULT VIDEOFLUOROSCOPIC SWALLOWING STUDY:   Referring Physician: Parish      Radiologist: Dr. Medina  Diagnosis: dysphagia    Date of Service: 12/10/2024     PATIENT SUMMARY   Chief Complaint: The patient c/o difficulty getting food to go down.  He found that if he pushes on something, it helps the food to go down. He needs to drink a lot of water when he is eating, and throughout the day to combat copious mucous..  He has a h/o laryngopharyngeal reflux.  Pt was told at one point he had vocal cord dysfunction. He has never had speech therapy for voice or swallowing.  Pt denies unintended weight los, odynophagia and shortness of breath.  Esophagram in 2017 showed transient laryngeal penetration and mass effect due to cervical osteophytes.  Current Diet: regular foods and liquids       Problem List  Active Problems:  Active Problems:    * No active hospital problems. *      Past Medical History  Past Medical History:    Bone spur    C5/C6    Esophageal reflux    Interstitial cystitis    Irritable bowel syndrome (IBS)    Lipid screening    Osteoarthrosis, unspecified whether generalized or localized, unspecified site    Rectal adenoma    tubulovillous        Imaging results: No recent CXR    ASSESSMENT   DYSPHAGIA ASSESSMENT  Test completed in conjunction with Radiologist.   Food/Liquid Types Presented: puree, solid, and thin liquids.    Study Position and View:  Patient was seated upright and viewed laterally.    Pain Assessment: The patient reports pain at a level of 0/10.    Oral phase:  Oral phase was within normal limits with adequate bilabial seal and bolus containment, timely mastication and transit and minimal to no oral retention.    Pharyngeal phase:  The pharyngeal response triggered at the tongue base for liquids and at the valleculae for puree and solids.  Epiglottic inversion was timely and complete with no laryngeal penetration or aspiration.  Base of  tongue retraction and hyolaryngeal excursion were adequate.  Trace vallecular retention remained.    Esophageal phase:   Adequate flow of bolus through upper esophagus     Penetration Aspiration Scale: 1/8.  Material does not enter the airway.    Overall Impression: Normal oropharyngeal swallow without laryngeal penetration or aspiration and without significant pharyngeal retention.    FCM category and level: Swallowing, 7  PLAN   Potential: Excellent    Diet Recommendations:  Solids: Regular  Liquids: Thin    Recommended compensatory strategies:   None required    Medication Administration:  No restrictions    Further Follow-up:  Follow up with Dr. Lugo.  No follow up warranted with this service.      EDUCATION/INSTRUCTION  Reviewed results and recommendations with patient.  Written instructions were provided.  Agreement/Understanding verbalized and all questions answered to their apparent satisfaction.      INTERDISCIPLINARY COMMUNICATION  Reviewed results with Radiologist; agreement verbalized.    Thank you for your referral. Please co-sign or sign and return this letter via fax as soon as possible. If you have any questions, please contact me at [phone].    Sharita Mario MA/DELORES-SLP  Speech Language Pathologist  [facility]  [phone]    Electronically signed by therapist: MANDEEP Barajas  Physician's certification required: No

## 2024-12-12 ENCOUNTER — OFFICE VISIT (OUTPATIENT)
Dept: INTERNAL MEDICINE CLINIC | Facility: CLINIC | Age: 64
End: 2024-12-12

## 2024-12-12 VITALS
BODY MASS INDEX: 26.63 KG/M2 | HEIGHT: 68 IN | HEART RATE: 77 BPM | OXYGEN SATURATION: 99 % | SYSTOLIC BLOOD PRESSURE: 144 MMHG | DIASTOLIC BLOOD PRESSURE: 74 MMHG | TEMPERATURE: 97 F | WEIGHT: 175.69 LBS

## 2024-12-12 DIAGNOSIS — D47.2 MGUS (MONOCLONAL GAMMOPATHY OF UNKNOWN SIGNIFICANCE): ICD-10-CM

## 2024-12-12 DIAGNOSIS — I10 HYPERTENSION, ESSENTIAL: Primary | ICD-10-CM

## 2024-12-12 PROCEDURE — 3078F DIAST BP <80 MM HG: CPT | Performed by: INTERNAL MEDICINE

## 2024-12-12 PROCEDURE — 3008F BODY MASS INDEX DOCD: CPT | Performed by: INTERNAL MEDICINE

## 2024-12-12 PROCEDURE — 99213 OFFICE O/P EST LOW 20 MIN: CPT | Performed by: INTERNAL MEDICINE

## 2024-12-12 PROCEDURE — 3077F SYST BP >= 140 MM HG: CPT | Performed by: INTERNAL MEDICINE

## 2024-12-12 NOTE — PROGRESS NOTES
Subjective:     Patient ID: Aguila Rodríguez is a 64 year old male.    Hypertension  This is a chronic problem. The current episode started more than 1 month ago. The problem has been waxing and waning since onset. The problem is uncontrolled. Pertinent negatives include no chest pain, peripheral edema or shortness of breath. There are no associated agents to hypertension. Risk factors for coronary artery disease include male gender. Past treatments include lifestyle changes. The current treatment provides moderate improvement. There are no compliance problems.  There is no history of angina, CAD/MI, CVA, heart failure or PVD. There is no history of chronic renal disease, a hypertension causing med or a thyroid problem.       History/Other:   Review of Systems   Constitutional: Negative.    Respiratory: Negative.  Negative for shortness of breath.    Cardiovascular: Negative.  Negative for chest pain.   Gastrointestinal: Negative.    Genitourinary: Negative.      Current Outpatient Medications   Medication Sig Dispense Refill    azelastine 0.1 % Nasal Solution 2 sprays by Nasal route 2 (two) times daily. 30 mL 0    Na Sulfate-K Sulfate-Mg Sulf (SUPREP BOWEL PREP KIT) 17.5-3.13-1.6 GM/177ML Oral Solution As directed - see instructions 1 each 0    dicyclomine 10 MG Oral Cap Take 1 capsule (10 mg total) by mouth 4 (four) times daily before meals and nightly. 120 capsule 1    Solifenacin Succinate 5 MG Oral Tab Take 1 tablet (5 mg total) by mouth daily. 30 tablet 1    DAILY MULTIPLE VITAMINS Oral Tab Take 1 tablet by mouth daily.       Allergies:Allergies[1]    Past Medical History:    Bone spur    C5/C6    Esophageal reflux    Interstitial cystitis    Irritable bowel syndrome (IBS)    Lipid screening    Osteoarthrosis, unspecified whether generalized or localized, unspecified site    Rectal adenoma    tubulovillous      Past Surgical History:   Procedure Laterality Date    Colonoscopy N/A 9/18/2024    Procedure:  COLONOSCOPY;  Surgeon: Ariel Greenberg MD;  Location: [facility] MAIN OR    Colonoscopy & polypectomy  04/22/2008    colonoscopy w/ snare polypectomy    Knee surgery      arthroscopic surgery    Tonsillectomy        Family History   Problem Relation Age of Onset    Hypertension Father     Cancer Father         bladder and prostate cancer    Hypertension Mother     Ear Problems Mother     Stroke Paternal Grandfather       Social History:   Social History     Socioeconomic History    Marital status: Single   Tobacco Use    Smoking status: Never     Passive exposure: Never    Smokeless tobacco: Never   Vaping Use    Vaping status: Never Used   Substance and Sexual Activity    Alcohol use: Not Currently     Comment: rarely    Drug use: No   Other Topics Concern    Caffeine Concern No     Social Drivers of Health      Received from [facility]        Objective:   Physical Exam  Constitutional:       General: He is not in acute distress.     Appearance: He is well-developed. He is not ill-appearing, toxic-appearing or diaphoretic.   HENT:      Head: Normocephalic and atraumatic.      Right Ear: Tympanic membrane, ear canal and external ear normal.      Left Ear: Tympanic membrane, ear canal and external ear normal.      Nose: Nose normal.      Mouth/Throat:      Pharynx: No oropharyngeal exudate.   Eyes:      General: No scleral icterus.        Right eye: No discharge.         Left eye: No discharge.      Conjunctiva/sclera: Conjunctivae normal.      Pupils: Pupils are equal, round, and reactive to light.   Neck:      Thyroid: No thyromegaly.      Vascular: No JVD.   Cardiovascular:      Rate and Rhythm: Normal rate and regular rhythm.      Pulses: Normal pulses.      Heart sounds: Normal heart sounds. No murmur heard.     No friction rub. No gallop.   Pulmonary:      Effort: Pulmonary effort is normal. No respiratory distress.      Breath sounds: Normal breath sounds. No wheezing or rales.   Abdominal:       General: Abdomen is flat. Bowel sounds are normal. There is no distension.      Palpations: Abdomen is soft. There is no mass.      Tenderness: There is no abdominal tenderness. There is no guarding or rebound.   Musculoskeletal:         General: Normal range of motion.      Cervical back: Normal range of motion and neck supple. No rigidity or tenderness.      Right lower leg: No edema.      Left lower leg: No edema.   Lymphadenopathy:      Cervical: No cervical adenopathy.   Skin:     General: Skin is warm and dry.      Coloration: Skin is not jaundiced or pale.      Findings: No rash.   Neurological:      Mental Status: He is alert and oriented to person, place, and time.   Psychiatric:         Mood and Affect: Mood normal.         Assessment & Plan:   (I10) Hypertension, essential  (primary encounter diagnosis)  Plan: bp elevated but he did gained at least 10 lbs for the past few months since he has started to loosen up on his diet.   He didn't tolerate bp meds before, would work again on losing some weight; recheck in 3mos. If remains elevated, then will start bp med.     (D47.2) MGUS (monoclonal gammopathy of unknown significance)  Plan: pt had been seen by DR Couch and will continue to ff pt.        No orders of the defined types were placed in this encounter.      Meds This Visit:  Requested Prescriptions      No prescriptions requested or ordered in this encounter       Imaging & Referrals:  None            [1]   Allergies  Allergen Reactions    Famotidine SWELLING     Patient states med caused his throat to swell and cause gagging.     Omeprazole SWELLING     Patient states cause a lump in  His throat. ds    Flonase [Fluticasone] NAUSEA ONLY

## 2024-12-23 ENCOUNTER — TELEPHONE (OUTPATIENT)
Dept: INTERNAL MEDICINE CLINIC | Facility: CLINIC | Age: 64
End: 2024-12-23

## 2024-12-23 ENCOUNTER — TELEPHONE (OUTPATIENT)
Dept: OTOLARYNGOLOGY | Facility: CLINIC | Age: 64
End: 2024-12-23

## 2024-12-23 NOTE — TELEPHONE ENCOUNTER
I attempted to reach Aguila, left him a voicemail saying his swallow study and esophagram appear to be relatively normal.  If he wishes to go over the results of the study he can simply make an appointment to be seen at his convenience to go over the results with Dr. Lugo.

## 2024-12-23 NOTE — TELEPHONE ENCOUNTER
Name and  verified.     Patient states he spoke with [employer] and they informed him that due to him switching to a \"point of service plan\" they don't accept Dr. 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 He states he talked to Dr. Esqueda regarding this recently and he did not believe it would be a problem. Informed the patient to call [facility] back and request documentation that states he is not accepted. Patient stated he would call [facility] back to confirm.

## 2024-12-27 ENCOUNTER — LAB ENCOUNTER (OUTPATIENT)
Dept: LAB | Age: 64
End: 2024-12-27
Attending: STUDENT IN AN ORGANIZED HEALTH CARE EDUCATION/TRAINING PROGRAM
Payer: COMMERCIAL

## 2024-12-27 DIAGNOSIS — N17.9 AKI (ACUTE KIDNEY INJURY) (HCC): ICD-10-CM

## 2024-12-27 DIAGNOSIS — D47.2 MONOCLONAL GAMMOPATHY OF UNKNOWN SIGNIFICANCE (MGUS): ICD-10-CM

## 2024-12-27 LAB
ALBUMIN SERPL-MCNC: 4.3 G/DL (ref 3.2–4.8)
ALBUMIN/GLOB SERPL: 1.4 {RATIO} (ref 1–2)
ALP LIVER SERPL-CCNC: 75 U/L
ALT SERPL-CCNC: 14 U/L
ANION GAP SERPL CALC-SCNC: 9 MMOL/L (ref 0–18)
AST SERPL-CCNC: 19 U/L (ref ?–34)
BASOPHILS # BLD AUTO: 0.04 X10(3) UL (ref 0–0.2)
BASOPHILS NFR BLD AUTO: 0.5 %
BILIRUB SERPL-MCNC: 0.9 MG/DL (ref 0.2–1.1)
BUN BLD-MCNC: 18 MG/DL (ref 9–23)
CALCIUM BLD-MCNC: 9.6 MG/DL (ref 8.7–10.4)
CHLORIDE SERPL-SCNC: 106 MMOL/L (ref 98–112)
CO2 SERPL-SCNC: 27 MMOL/L (ref 21–32)
CREAT BLD-MCNC: 1.16 MG/DL
EGFRCR SERPLBLD CKD-EPI 2021: 71 ML/MIN/1.73M2 (ref 60–?)
EOSINOPHIL # BLD AUTO: 0.1 X10(3) UL (ref 0–0.7)
EOSINOPHIL NFR BLD AUTO: 1.4 %
ERYTHROCYTE [DISTWIDTH] IN BLOOD BY AUTOMATED COUNT: 12.8 %
FASTING STATUS PATIENT QL REPORTED: YES
GLOBULIN PLAS-MCNC: 3.1 G/DL (ref 2–3.5)
GLUCOSE BLD-MCNC: 93 MG/DL (ref 70–99)
HCT VFR BLD AUTO: 40.2 %
HGB BLD-MCNC: 13.6 G/DL
IGA SERPL-MCNC: 263.7 MG/DL (ref 40–350)
IGM SERPL-MCNC: 361 MG/DL (ref 50–300)
IMM GRANULOCYTES # BLD AUTO: 0.01 X10(3) UL (ref 0–1)
IMM GRANULOCYTES NFR BLD: 0.1 %
IMMUNOGLOBULIN PNL SER-MCNC: 1079 MG/DL (ref 650–1600)
LYMPHOCYTES # BLD AUTO: 2.19 X10(3) UL (ref 1–4)
LYMPHOCYTES NFR BLD AUTO: 30.1 %
MCH RBC QN AUTO: 31.3 PG (ref 26–34)
MCHC RBC AUTO-ENTMCNC: 33.8 G/DL (ref 31–37)
MCV RBC AUTO: 92.6 FL
MONOCYTES # BLD AUTO: 0.52 X10(3) UL (ref 0.1–1)
MONOCYTES NFR BLD AUTO: 7.1 %
NEUTROPHILS # BLD AUTO: 4.42 X10 (3) UL (ref 1.5–7.7)
NEUTROPHILS # BLD AUTO: 4.42 X10(3) UL (ref 1.5–7.7)
NEUTROPHILS NFR BLD AUTO: 60.8 %
OSMOLALITY SERPL CALC.SUM OF ELEC: 296 MOSM/KG (ref 275–295)
PHOSPHATE SERPL-MCNC: 4 MG/DL (ref 2.4–5.1)
PLATELET # BLD AUTO: 246 10(3)UL (ref 150–450)
POTASSIUM SERPL-SCNC: 4.5 MMOL/L (ref 3.5–5.1)
PROT SERPL-MCNC: 7.4 G/DL (ref 5.7–8.2)
RBC # BLD AUTO: 4.34 X10(6)UL
SODIUM SERPL-SCNC: 142 MMOL/L (ref 136–145)
WBC # BLD AUTO: 7.3 X10(3) UL (ref 4–11)

## 2024-12-27 PROCEDURE — 84165 PROTEIN E-PHORESIS SERUM: CPT

## 2024-12-27 PROCEDURE — 85025 COMPLETE CBC W/AUTO DIFF WBC: CPT

## 2024-12-27 PROCEDURE — 86334 IMMUNOFIX E-PHORESIS SERUM: CPT

## 2024-12-27 PROCEDURE — 83521 IG LIGHT CHAINS FREE EACH: CPT

## 2024-12-27 PROCEDURE — 84100 ASSAY OF PHOSPHORUS: CPT

## 2024-12-27 PROCEDURE — 36415 COLL VENOUS BLD VENIPUNCTURE: CPT

## 2024-12-27 PROCEDURE — 80053 COMPREHEN METABOLIC PANEL: CPT

## 2024-12-27 PROCEDURE — 82784 ASSAY IGA/IGD/IGG/IGM EACH: CPT

## 2024-12-30 LAB
KAPPA LC FREE SER-MCNC: 2.76 MG/DL (ref 0.33–1.94)
KAPPA LC FREE/LAMBDA FREE SER NEPH: 2.22 {RATIO} (ref 0.26–1.65)
LAMBDA LC FREE SERPL-MCNC: 1.25 MG/DL (ref 0.57–2.63)

## 2024-12-31 LAB
ALBUMIN SERPL ELPH-MCNC: 3.94 G/DL (ref 3.75–5.21)
ALBUMIN/GLOB SERPL: 1.25 {RATIO} (ref 1–2)
ALPHA1 GLOB SERPL ELPH-MCNC: 0.28 G/DL (ref 0.19–0.46)
ALPHA2 GLOB SERPL ELPH-MCNC: 0.71 G/DL (ref 0.48–1.05)
B-GLOBULIN SERPL ELPH-MCNC: 0.84 G/DL (ref 0.68–1.23)
GAMMA GLOB SERPL ELPH-MCNC: 1.33 G/DL (ref 0.62–1.7)
M PROTEIN 1 SERPL ELPH-MCNC: 0.26 G/DL (ref ?–0)
M PROTEIN 2 SERPL ELPH-MCNC: 0.21 G/DL (ref ?–0)
PROT SERPL-MCNC: 7.1 G/DL (ref 5.7–8.2)

## 2025-01-03 ENCOUNTER — TELEPHONE (OUTPATIENT)
Dept: INTERNAL MEDICINE CLINIC | Facility: CLINIC | Age: 65
End: 2025-01-03

## 2025-01-03 DIAGNOSIS — D47.2 MONOCLONAL GAMMOPATHY: Primary | ICD-10-CM

## 2025-01-03 NOTE — TELEPHONE ENCOUNTER
Left  detailed Voicemail  that referral was sent as he requested, call back our office if any questions. Office phone number provided with office telephone hours.

## 2025-01-03 NOTE — TELEPHONE ENCOUNTER
Verified name and .    Patient is requesting referral to see Dr. Couch (hematology) for follow up on monoclonal gammopathy.    Referral pended for your approval.

## 2025-01-06 ENCOUNTER — TELEPHONE (OUTPATIENT)
Dept: HEMATOLOGY/ONCOLOGY | Facility: HOSPITAL | Age: 65
End: 2025-01-06

## 2025-01-06 NOTE — TELEPHONE ENCOUNTER
Writer called patient back as requested and left a voicemail informing him that Dr. Couch will review labs with him in appointment on 1/10. Per Last visit note in July 2024, plan  for follow-up in 6 months for reassessment, if normal at that time we can likely follow him yearly.

## 2025-01-06 NOTE — TELEPHONE ENCOUNTER
Aguila -[phone] Would like to know if Dr. Couch is going to look at his labs so that he  will know if he needs to come in for his appointment on Friday 1/10/25. Thanks Pham   normal (ped)...

## 2025-01-10 ENCOUNTER — OFFICE VISIT (OUTPATIENT)
Age: 65
End: 2025-01-10
Attending: STUDENT IN AN ORGANIZED HEALTH CARE EDUCATION/TRAINING PROGRAM
Payer: COMMERCIAL

## 2025-01-10 VITALS
RESPIRATION RATE: 16 BRPM | WEIGHT: 176 LBS | OXYGEN SATURATION: 100 % | DIASTOLIC BLOOD PRESSURE: 78 MMHG | TEMPERATURE: 99 F | SYSTOLIC BLOOD PRESSURE: 162 MMHG | HEIGHT: 68 IN | HEART RATE: 61 BPM | BODY MASS INDEX: 26.67 KG/M2

## 2025-01-10 DIAGNOSIS — D47.2 MONOCLONAL GAMMOPATHY OF UNKNOWN SIGNIFICANCE (MGUS): Primary | ICD-10-CM

## 2025-01-10 NOTE — PROGRESS NOTES
[facility] Hematology Oncology   [facility]  Hematology Clinic Progress Note    Patient Name: Aguila Rodríguez   YOB: 1960   Medical Record Number: [id number]    Referred by: Dr. Esqueda    Subjective:   Aguila Rodríguez is a 64 year old male with a history of GERD and IBS, CKD who follows with Dr. Mcfarland who was referred to hematology by Dr. Esqueda for IgM kappa monoclonal protein.  The patient saw nephrology for CKD of unclear etiology with creatinine of 1.55.  Serum protein electrophoresis was ordered for CKD workup which showed an IgM kappa M spike of 0.28 and the patient was referred to hematology in July 2024.  Additional workup revealed IgM kappa MGUS and he presents for surveillance follow-up today.    Clinically he continues to do well without significant concerns or complaints.  Renal function is stable.    Review of Systems:  Hematology/Oncology ROS performed and negative except as above in HPI    History/Other:   Current Medications:   dicyclomine 10 MG Oral Cap Take 1 capsule (10 mg total) by mouth 4 (four) times daily before meals and nightly. 120 capsule 1    DAILY MULTIPLE VITAMINS Oral Tab Take 1 tablet by mouth daily.         Allergies:   Allergies   Allergen Reactions    Famotidine SWELLING     Patient states med caused his throat to swell and cause gagging.     Omeprazole SWELLING     Patient states cause a lump in  His throat. ds    Flonase [Fluticasone] NAUSEA ONLY     Objective:   Blood pressure (!) 162/78, pulse 61, temperature 98.7 °F (37.1 °C), temperature source Tympanic, resp. rate 16, height 1.727 m (5' 8\"), weight 79.8 kg (176 lb), SpO2 100%.  Physical Exam:  ECOG PS: 0  General: A&Ox3, NAD  HEENT: PERRL, OP clear  CV: RRR  Pulm: normal effort  Extremities: no edema  Neurological: grossly intact    Results:   Labs:  Lab Results   Component Value Date    WBC 7.3 12/27/2024    HGB 13.6 12/27/2024    HCT 40.2 12/27/2024    .0 12/27/2024     CREATSERUM 1.16 12/27/2024    BUN 18 12/27/2024     12/27/2024    K 4.5 12/27/2024     12/27/2024    CO2 27.0 12/27/2024    GLU 93 12/27/2024    CA 9.6 12/27/2024    ALB 4.3 12/27/2024    ALB 3.94 12/27/2024    ALKPHO 75 12/27/2024    BILT 0.9 12/27/2024    TP 7.4 12/27/2024    TP 7.1 12/27/2024    AST 19 12/27/2024    ALT 14 12/27/2024    ESRML 16 06/17/2024    CRP <0.29 06/17/2024    PHOS 4.0 12/27/2024    TROP 0.00 02/18/2017     Assessment & Plan:   Aguila Rodríguez is a 64 year old male with a history of GERD and IBS, CKD who follows with Dr. Mcfarland who was referred to hematology for IgM kappa MGUS.    Clinically stable, labs stable.  We again discussed the pathology of plasma cell dyscrasias and need to continue monitoring surveillance of the patient's IgM kappa MGUS.  Clinically stable we will follow-up in 1 year.  He continues to follow with nephrology and his PCP.    GLADYS Couch, [facility] Hematology/Oncology  [facility]     This note was created using a voice-recognition transcribing system. Incorrect words or phrases may have been missed during proofreading. Please interpret accordingly.

## 2025-01-17 ENCOUNTER — TELEPHONE (OUTPATIENT)
Dept: INTERNAL MEDICINE CLINIC | Facility: CLINIC | Age: 65
End: 2025-01-17

## 2025-01-17 NOTE — TELEPHONE ENCOUNTER
Left voice message informing patient to contact our office so we can schedule an appointment for him.

## 2025-06-16 ENCOUNTER — LAB ENCOUNTER (OUTPATIENT)
Dept: LAB | Age: 65
End: 2025-06-16
Attending: INTERNAL MEDICINE
Payer: COMMERCIAL

## 2025-06-16 DIAGNOSIS — N17.9 AKI (ACUTE KIDNEY INJURY): ICD-10-CM

## 2025-06-16 LAB
ALBUMIN SERPL-MCNC: 4.6 G/DL (ref 3.2–4.8)
ANION GAP SERPL CALC-SCNC: 10 MMOL/L (ref 0–18)
BASOPHILS # BLD AUTO: 0.04 X10(3) UL (ref 0–0.2)
BASOPHILS NFR BLD AUTO: 0.5 %
BUN BLD-MCNC: 14 MG/DL (ref 9–23)
CALCIUM BLD-MCNC: 9.1 MG/DL (ref 8.7–10.6)
CHLORIDE SERPL-SCNC: 106 MMOL/L (ref 98–112)
CO2 SERPL-SCNC: 24 MMOL/L (ref 21–32)
CREAT BLD-MCNC: 1.23 MG/DL (ref 0.7–1.3)
EGFRCR SERPLBLD CKD-EPI 2021: 66 ML/MIN/1.73M2 (ref 60–?)
EOSINOPHIL # BLD AUTO: 0.15 X10(3) UL (ref 0–0.7)
EOSINOPHIL NFR BLD AUTO: 1.9 %
ERYTHROCYTE [DISTWIDTH] IN BLOOD BY AUTOMATED COUNT: 13.3 %
GLUCOSE BLD-MCNC: 84 MG/DL (ref 70–99)
HCT VFR BLD AUTO: 40.1 % (ref 39–53)
HGB BLD-MCNC: 13.3 G/DL (ref 13–17.5)
IMM GRANULOCYTES # BLD AUTO: 0.01 X10(3) UL (ref 0–1)
IMM GRANULOCYTES NFR BLD: 0.1 %
LYMPHOCYTES # BLD AUTO: 2.48 X10(3) UL (ref 1–4)
LYMPHOCYTES NFR BLD AUTO: 30.8 %
MCH RBC QN AUTO: 30.4 PG (ref 26–34)
MCHC RBC AUTO-ENTMCNC: 33.2 G/DL (ref 31–37)
MCV RBC AUTO: 91.8 FL (ref 80–100)
MONOCYTES # BLD AUTO: 0.65 X10(3) UL (ref 0.1–1)
MONOCYTES NFR BLD AUTO: 8.1 %
NEUTROPHILS # BLD AUTO: 4.73 X10 (3) UL (ref 1.5–7.7)
NEUTROPHILS # BLD AUTO: 4.73 X10(3) UL (ref 1.5–7.7)
NEUTROPHILS NFR BLD AUTO: 58.6 %
OSMOLALITY SERPL CALC.SUM OF ELEC: 290 MOSM/KG (ref 275–295)
PHOSPHATE SERPL-MCNC: 3.6 MG/DL (ref 2.4–5.1)
PLATELET # BLD AUTO: 249 10(3)UL (ref 150–450)
POTASSIUM SERPL-SCNC: 3.9 MMOL/L (ref 3.5–5.1)
RBC # BLD AUTO: 4.37 X10(6)UL (ref 4.3–5.7)
SODIUM SERPL-SCNC: 140 MMOL/L (ref 136–145)
WBC # BLD AUTO: 8.1 X10(3) UL (ref 4–11)

## 2025-06-16 PROCEDURE — 80069 RENAL FUNCTION PANEL: CPT

## 2025-06-16 PROCEDURE — 36415 COLL VENOUS BLD VENIPUNCTURE: CPT

## 2025-06-16 PROCEDURE — 85025 COMPLETE CBC W/AUTO DIFF WBC: CPT

## 2025-06-17 ENCOUNTER — TELEPHONE (OUTPATIENT)
Dept: NEPHROLOGY | Facility: CLINIC | Age: 65
End: 2025-06-17

## 2025-06-17 DIAGNOSIS — N17.9 AKI (ACUTE KIDNEY INJURY): Primary | ICD-10-CM

## 2025-08-09 ENCOUNTER — HOSPITAL ENCOUNTER (EMERGENCY)
Age: 65
Discharge: HOME | End: 2025-08-09
Attending: EMERGENCY MEDICINE

## 2025-08-09 ENCOUNTER — APPOINTMENT (OUTPATIENT)
Dept: CT IMAGING | Age: 65
End: 2025-08-09
Attending: EMERGENCY MEDICINE

## 2025-08-09 VITALS
BODY MASS INDEX: 30.24 KG/M2 | HEART RATE: 59 BPM | SYSTOLIC BLOOD PRESSURE: 156 MMHG | OXYGEN SATURATION: 98 % | RESPIRATION RATE: 16 BRPM | HEIGHT: 68 IN | DIASTOLIC BLOOD PRESSURE: 73 MMHG | TEMPERATURE: 97.8 F

## 2025-08-09 DIAGNOSIS — N20.0 KIDNEY STONE: Primary | ICD-10-CM

## 2025-08-09 LAB
ALBUMIN SERPL-MCNC: 3.7 G/DL (ref 3.4–5)
ALBUMIN/GLOB SERPL: 0.9 {RATIO} (ref 1–2.4)
ALP SERPL-CCNC: 77 UNITS/L (ref 45–117)
ALT SERPL-CCNC: 20 UNITS/L
ANION GAP SERPL CALC-SCNC: 10 MMOL/L (ref 7–19)
APPEARANCE UR: CLEAR
AST SERPL-CCNC: 23 UNITS/L
BACTERIA #/AREA URNS HPF: ABNORMAL /HPF
BASOPHILS # BLD: 0 K/MCL (ref 0–0.3)
BASOPHILS NFR BLD: 0 %
BILIRUB SERPL-MCNC: 0.8 MG/DL (ref 0.2–1)
BILIRUB UR QL STRIP: NEGATIVE
BUN SERPL-MCNC: 27 MG/DL (ref 6–20)
BUN/CREAT SERPL: 20 (ref 7–25)
CALCIUM SERPL-MCNC: 9.1 MG/DL (ref 8.4–10.2)
CHLORIDE SERPL-SCNC: 109 MMOL/L (ref 97–110)
CO2 SERPL-SCNC: 25 MMOL/L (ref 21–32)
COLOR UR: ABNORMAL
CREAT SERPL-MCNC: 1.33 MG/DL (ref 0.67–1.17)
DEPRECATED RDW RBC: 42.5 FL (ref 39–50)
EGFRCR SERPLBLD CKD-EPI 2021: 60 ML/MIN/{1.73_M2}
EOSINOPHIL # BLD: 0 K/MCL (ref 0–0.5)
EOSINOPHIL NFR BLD: 0 %
ERYTHROCYTE [DISTWIDTH] IN BLOOD: 13 % (ref 11–15)
FASTING DURATION TIME PATIENT: ABNORMAL H
GLOBULIN SER-MCNC: 4 G/DL (ref 2–4)
GLUCOSE SERPL-MCNC: 129 MG/DL (ref 70–99)
GLUCOSE UR STRIP-MCNC: NEGATIVE MG/DL
HCT VFR BLD CALC: 39.8 % (ref 39–51)
HGB BLD-MCNC: 13.3 G/DL (ref 13–17)
HGB UR QL STRIP: ABNORMAL
HYALINE CASTS #/AREA URNS LPF: ABNORMAL /LPF
IMM GRANULOCYTES # BLD AUTO: 0.1 K/MCL (ref 0–0.2)
IMM GRANULOCYTES # BLD: 0 %
KETONES UR STRIP-MCNC: NEGATIVE MG/DL
LEUKOCYTE ESTERASE UR QL STRIP: NEGATIVE
LIPASE SERPL-CCNC: 24 UNITS/L (ref 15–77)
LYMPHOCYTES # BLD: 1.7 K/MCL (ref 1–4)
LYMPHOCYTES NFR BLD: 11 %
MCH RBC QN AUTO: 30.4 PG (ref 26–34)
MCHC RBC AUTO-ENTMCNC: 33.4 G/DL (ref 32–36.5)
MCV RBC AUTO: 90.9 FL (ref 78–100)
MONOCYTES # BLD: 0.8 K/MCL (ref 0.3–0.9)
MONOCYTES NFR BLD: 5 %
MUCOUS THREADS URNS QL MICRO: PRESENT
NEUTROPHILS # BLD: 12.9 K/MCL (ref 1.8–7.7)
NEUTROPHILS NFR BLD: 84 %
NITRITE UR QL STRIP: NEGATIVE
NRBC BLD MANUAL-RTO: 0 /100 WBC
PH UR STRIP: 5 [PH] (ref 5–7)
PLATELET # BLD AUTO: 254 K/MCL (ref 140–450)
POTASSIUM SERPL-SCNC: 3.9 MMOL/L (ref 3.4–5.1)
PROT SERPL-MCNC: 7.7 G/DL (ref 6.4–8.2)
PROT UR STRIP-MCNC: NEGATIVE MG/DL
RAINBOW EXTRA TUBES HOLD SPECIMEN: NORMAL
RAINBOW EXTRA TUBES HOLD SPECIMEN: NORMAL
RBC # BLD: 4.38 MIL/MCL (ref 4.5–5.9)
RBC #/AREA URNS HPF: ABNORMAL /HPF
SODIUM SERPL-SCNC: 140 MMOL/L (ref 135–145)
SP GR UR STRIP: 1.02 (ref 1–1.03)
SQUAMOUS #/AREA URNS HPF: ABNORMAL /HPF
UROBILINOGEN UR STRIP-MCNC: 0.2 MG/DL
WBC # BLD: 15.5 K/MCL (ref 4.2–11)
WBC #/AREA URNS HPF: ABNORMAL /HPF

## 2025-08-09 PROCEDURE — 85025 COMPLETE CBC W/AUTO DIFF WBC: CPT | Performed by: EMERGENCY MEDICINE

## 2025-08-09 PROCEDURE — 96361 HYDRATE IV INFUSION ADD-ON: CPT

## 2025-08-09 PROCEDURE — 10002807 HB RX 258: Performed by: EMERGENCY MEDICINE

## 2025-08-09 PROCEDURE — 10002800 HB RX 250 W HCPCS: Performed by: EMERGENCY MEDICINE

## 2025-08-09 PROCEDURE — 80053 COMPREHEN METABOLIC PANEL: CPT | Performed by: EMERGENCY MEDICINE

## 2025-08-09 PROCEDURE — 96374 THER/PROPH/DIAG INJ IV PUSH: CPT

## 2025-08-09 PROCEDURE — 83690 ASSAY OF LIPASE: CPT | Performed by: EMERGENCY MEDICINE

## 2025-08-09 PROCEDURE — 74176 CT ABD & PELVIS W/O CONTRAST: CPT

## 2025-08-09 PROCEDURE — 81001 URINALYSIS AUTO W/SCOPE: CPT | Performed by: EMERGENCY MEDICINE

## 2025-08-09 PROCEDURE — 99284 EMERGENCY DEPT VISIT MOD MDM: CPT | Performed by: EMERGENCY MEDICINE

## 2025-08-09 RX ORDER — ONDANSETRON 4 MG/1
4 TABLET, ORALLY DISINTEGRATING ORAL EVERY 6 HOURS
Qty: 10 TABLET | Refills: 0 | Status: SHIPPED | OUTPATIENT
Start: 2025-08-09

## 2025-08-09 RX ORDER — TAMSULOSIN HYDROCHLORIDE 0.4 MG/1
0.4 CAPSULE ORAL DAILY
Qty: 7 CAPSULE | Refills: 0 | Status: SHIPPED | OUTPATIENT
Start: 2025-08-09 | End: 2025-08-16

## 2025-08-09 RX ORDER — HYDROCODONE BITARTRATE AND ACETAMINOPHEN 5; 325 MG/1; MG/1
1 TABLET ORAL EVERY 4 HOURS PRN
Qty: 9 TABLET | Refills: 0 | Status: SHIPPED | OUTPATIENT
Start: 2025-08-09

## 2025-08-09 RX ORDER — KETOROLAC TROMETHAMINE 15 MG/ML
15 INJECTION, SOLUTION INTRAMUSCULAR; INTRAVENOUS ONCE
Status: COMPLETED | OUTPATIENT
Start: 2025-08-09 | End: 2025-08-09

## 2025-08-09 RX ADMIN — SODIUM CHLORIDE 1000 ML: 9 INJECTION, SOLUTION INTRAVENOUS at 03:27

## 2025-08-09 RX ADMIN — KETOROLAC TROMETHAMINE 15 MG: 15 INJECTION, SOLUTION INTRAMUSCULAR; INTRAVENOUS at 03:26

## 2025-08-09 ASSESSMENT — MOVEMENT AND STRENGTH ASSESSMENTS: ALL_EXTREMITIES: EQUAL STRENGTH/TONE/MOVEMENT

## (undated) NOTE — LETTER
No referring provider defined for this encounter.       09/20/24        Patient: Aguila Rodríguez   YOB: 1960   Date of Visit: 9/20/2024       Dear  Dr. Yin MD,      Thank you for referring Aguila Rodríguez to my practice.  Please find my assessment and plan below.      As you know he is a 63-year-old male with a history of borderline hypertension, irritable bowel syndrome, history of interstitial cystitis who I now had the pleasure of seeing for follow-up of what now appears to be acute renal failure.  Overall patient states has been doing well without any chest pain, shortness of breath, GI or urinary tract symptoms.  Continues to watch his diet carefully.  He has been hoping to avoid any antihypertensive medications.  He did see hematology in July and was told that he had MGUS.  He just had a colonoscopy done earlier this month and had an adenomatous polyp removed.  Still has some symptoms consistent with irritable bowel syndrome.  GI just put him on a fiber supplement.    On physical exam his blood pressure is 126/67 with a pulse of 67 he weighed 164 pounds.  His neck was supple without JVD.  Lungs are clear.  Heart revealed a regular rate and rhythm without gallops or murmurs.  Abdomen soft, flat, nontender without organomegaly, masses or bruits.  Extremities revealed no edema.    I reviewed his most recent labs which were done on September 13, 2024.  Creatinine remains good at 1.12 with an estimated GFR of 75 cc/min.  Hemoglobin 13.1.  Electrolytes are good.    I therefore reassured the patient there is acute renal failure now seems to have resolved.  His blood pressure seems to be okay today off medications.  He knows to maintain adequate hydration.  Avoid nonsteroidals.  He will repeat his renal panel in January 2025 when he does follow-up laboratory studies for hematology.  If his renal function remains normal may not need to see him for follow-up unless you felt it was clinically  indicated.    Thank you again for allowing me to participate in the care of your patient.  If you have any questions please feel free to call.           Sincerely,   Rio Mcfarland MD   [facility], [facility], [place]  [address] [place] [address] [address]    Document electronically generated by:  Rio Mcfarland MD

## (undated) NOTE — LETTER
10/18/2024          Aguila Rodríguez    [address] [facility] [address]         Dear Aguila,    Our records indicate that the tests ordered for you by Ariel Greenberg MD  have not been done.  If you have, in fact, already completed the tests or you do not wish to have the tests done, please contact our office at THE NUMBER LISTED BELOW.  Otherwise, please proceed with the testing.  Enclosed is a duplicate order for your convenience.  Labs Order:    C. diff toxigenic PCR (OPT)  Giardia + Crypto Antigen, Stool  Stool Culture W/Shigatoxin      Sincerely,    Ariel Greenberg MD  [address] [address] [phone]

## (undated) NOTE — MR AVS SNAPSHOT
[address]  [address] [facility]  [phone]               Thank you for choosing us for your health care visit with Xin Wolf MD.  We are glad to serve you and happy to provide you with this s Commonly known as:  GAVISCON EXTRA STRENGTH           DAILY MULTIPLE VITAMINS Tabs   Take 1 tablet by mouth daily. Dicyclomine HCl 10 MG Caps   Take 10 mg by mouth as needed.    Commonly known as:  BENTYL           famoTIDine 20 MG Tabs   Take 1 t

## (undated) NOTE — LETTER
No referring provider defined for this encounter.       06/21/24        Patient: Aguila Rodríguez   YOB: 1960   Date of Visit: 6/21/2024       Dear  Dr. Yin MD,      Thank you for referring Aguila Rodríguez to my practice.  Please find my assessment and plan below.      As you know he is a 63-year-old male with a history of borderline hypertension, irritable bowel syndrome, history of interstitial cystitis who I now had the pleasure of seeing for what may have been acute renal failure now resolved.  As you know his creatinine has been creeping up to 1.75 in March 2024.  He states since January of this year he has been trying to lose weight.  When I first saw him in April 2024 he weighed 174 pounds.  Today's down to 166 pounds.  He states he is no longer taking any blood pressure medications.    The patient just underwent a recent renal evaluation.  For some reason it took him a while to do laboratory studies which she just did on June 17, 2024.  Of note however is that his kidney function has improved significantly.  Creatinine is down to 1.05 now with an estimated GFR of 81 cc/min.  Electrolytes were normal.  Hemoglobin 12.9.  Urinalysis likewise was unremarkable.  Sed rate and connective tissue studies were nonrevealing.  An SPEP and random urine for Bence-Nam protein though are still pending.    I therefore informed the patient that his renal function now seems normal.  He currently is off all antihypertensive medications and blood pressures are reasonable.  He has been doing a lot of reading on the Internet.  I am however concerned that he may be overdoing his dietary adjustments.  For example he is only eating 4 ounces of meat every other day.  He cannot take any dairy products because they exacerbates his irritable bowel syndrome.  I discussed that he should shoot for 60 g of protein per day.  I am also concerned about his caloric intake as he continues to lose weight.  Therefore I  referred him to see a dietitian.  He also notes greasy bowels from time to time.  He told me that you are advising that he do a screening colonoscopy and I encouraged him to follow through.  I took the liberty of referring him to see a dietitian so he can get appropriate advice.  Otherwise maintain adequate hydration.  Avoid nonsteroidals.  Will repeat a CBC and renal panel in 3 months to ensure stability of his renal function.  He will continue to monitor his blood pressures regularly    Thank you again for allowing me to participate in the care of your patient.  If you have any questions please feel free to call.        Sincerely,   Rio Mcfarland MD   [facility], [facility], [place]  [address]  [facility] [address]    Document electronically generated by:  Rio Mcfarland MD

## (undated) NOTE — ED AVS SNAPSHOT
[facility] Emergency Department    [address]     [address] [place] [address]    Phone:  [phone]    Fax:  [address]   MRN: [id number]    Department:  [facility] Emergency Department   Date of Visit:  2/18 and Class Registration line at [phone] or find a doctor online by visiting [email or web address].    IF THERE IS ANY CHANGE OR WORSENING OF YOUR CONDITION, CALL YOUR PRIMARY CARE PHYSICIAN AT ONCE OR RETURN IMMEDIATELY TO [address] [place] [address].     If

## (undated) NOTE — ED AVS SNAPSHOT
[facility] Emergency Department    [address]     [address] [place] [address]    Phone:  [phone]    Fax:  [address]   MRN: [id number]    Department:  [facility] Emergency Department   Date of Visit:  2/18 Where to Get Your Medications      You can get these medications from any pharmacy     Bring a paper prescription for each of these medications    - famoTIDine 20 MG Tabs            Disclosure     Insurance plans vary and the physician(s) referred by the E IF THERE IS ANY CHANGE OR WORSENING OF YOUR CONDITION, CALL YOUR PRIMARY CARE PHYSICIAN AT ONCE OR RETURN IMMEDIATELY TO THE EMERGENCY DEPARTMENT.     If you have been prescribed any medication(s), please fill your prescription right away and begin taking t - If you have concerns related to behavioral health issues or thoughts of harming yourself, contact [address] [place] [address] at [phone].     - If you don’t have insurance, Ja [facility] has partnered with [employer]

## (undated) NOTE — LETTER
Justin Esqueda Md  [address] [place] [address]  [address]  [place] [address]       04/05/24        Patient: Aguila Rodríguez   YOB: 1960   Date of Visit: 4/5/2024       Dear  Dr. Yin MD,      Thank you for referring Aguila Rodríguez to my practice.  Please find my assessment and plan below.      As you know he is a 63-year-old male with a history of possible hypertension, irritable bowel syndrome, history of interstitial cystitis who I now the pleasure of seeing for what may be chronic kidney disease stage III.  Laboratory studies have been reviewed in [facility] Everywhere.  He had a creatinine 1.22 back in February 2017.  There is a gap of 7 years in which she did not have any laboratory studies up until February 23, 2024 when his creatinine was 1.55.  Repeat creatinine was 1.75 on March 14, 2024 with an estimated GFR of 44 cc/min and renal consultation has now been advised.    Of note is the patient had a gap of 7 years in which she did not see a physician.  He was not really checking his blood pressures.  Presented to the emergency room on January 11, 2024.  Blood pressure was 171/87.  Presented with dizziness.  Was thought to have acute labyrinth-itis.  CT scan of the brain was nonrevealing.  Creatinine that day was 1.24.  He has some visual issues as well.  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  He then saw you and losartan 25 mg daily was started on February 2024.  He states he did not tolerate losartan and felt dizzy with headaches and light sensitivity.  He again went to the emergency room at [facility] on March 15, 2024 with severe headache.  His blood pressure was 167/78.  Repeat creatinine was actually better down to 1.38.  A renal ultrasound was also performed which revealed normal-sized kidney with bilateral renal cysts.  A 7 mm nonobstructing right renal calculi was noted but otherwise was  unremarkable.    The patient states that he has been making a concerted effort to alter his lifestyle to lose weight.  States he is lost close to 20 pounds since January.  As result of this his blood pressure has improved and he is currently on no antihypertensive medications.    Past medical history is significant for irritable bowel syndrome and interstitial cystitis.  He had seen a urologist in the past and received some treatment which did not help.  Therefore he has just been living with this.  States he gets intermittent dysuria and frequency.  Irritable bowel symptoms controlled with Bentyl.  Medications are as listed.  Denies any recent significant use of nonsteroidals.  Social history non-smoker.  Family is notable for father had bladder and prostate cancer.  Hypertension is common in his family.  But no renal pathology.  Review of system at the present time the patient states he is feeling well without any chest pain, shortness of breath, GI or urinary tract symptoms.  Currently no dizziness or headaches.    On physical exam his blood pressure is 120/58 with a pulse of 72 and he weighed 174 pounds.  His neck was supple without JVD.  Lungs are clear.  Heart revealed a regular rate and rhythm with an S4 but no gallops, murmurs or rubs.  Abdomen soft, flat, nontender without organomegaly, masses or bruits.  Extremities revealed no clubbing, cyanosis or edema.    I therefore informed the patient did have an elevated creatinine.  It had increased to 1.75 on March 14, 2024 but then the following day on March 15, 2024 at an outside hospital his creatinine was better down to 1.38.  It is hard to tell when his creatinine may have started to creep up as there was a gap of 7 years in which laboratory studies were not done.  Patient may also have had hypertension for period of time without being aware of this.  Suspect though that he may have had significant hypertension if he truly has had a hypertensive retinal  bleed.  His blood pressures though now seem to be better with just weight reduction.  For completion sake will repeat a CBC, renal panel, urinalysis, urine for microalbumin, urine for Bence-Nam protein, sed rate and connective tissue profile.  Reinforced the importance of following a low-salt diet.  Avoid nonsteroidals.  Further impressions and recommendations will be forthcoming after reviewing the above.  Check blood pressures daily and record.    Thank you very much for allowing me to participate in the care of your patient.  If you have any questions please feel free to call.        Sincerely,   Rio Mcfarland MD   [facility], [facility], [place]  [address]  [facility] [id number]    Document electronically generated by:  Rio Mcfarland MD